# Patient Record
Sex: FEMALE | Race: WHITE | NOT HISPANIC OR LATINO | Employment: OTHER | URBAN - METROPOLITAN AREA
[De-identification: names, ages, dates, MRNs, and addresses within clinical notes are randomized per-mention and may not be internally consistent; named-entity substitution may affect disease eponyms.]

---

## 2023-05-11 DIAGNOSIS — Z78.0 OSTEOPENIA AFTER MENOPAUSE: ICD-10-CM

## 2023-05-11 DIAGNOSIS — M19.90 INFLAMMATORY ARTHRITIS: ICD-10-CM

## 2023-05-11 DIAGNOSIS — Z12.11 ENCOUNTER FOR SCREENING FOR MALIGNANT NEOPLASM OF COLON: ICD-10-CM

## 2023-05-11 DIAGNOSIS — M85.80 OSTEOPENIA AFTER MENOPAUSE: ICD-10-CM

## 2023-05-11 DIAGNOSIS — I10 BENIGN ESSENTIAL HYPERTENSION: Primary | ICD-10-CM

## 2023-05-11 PROBLEM — R91.8 LUNG NODULES: Status: ACTIVE | Noted: 2023-05-11

## 2023-05-11 PROBLEM — M48.02 CERVICAL SPINAL STENOSIS: Status: ACTIVE | Noted: 2023-05-11

## 2023-06-09 ENCOUNTER — LAB (OUTPATIENT)
Dept: LAB | Facility: LAB | Age: 78
End: 2023-06-09
Payer: MEDICARE

## 2023-06-09 DIAGNOSIS — M19.90 INFLAMMATORY ARTHRITIS: ICD-10-CM

## 2023-06-09 DIAGNOSIS — I10 BENIGN ESSENTIAL HYPERTENSION: ICD-10-CM

## 2023-06-09 DIAGNOSIS — M85.80 OSTEOPENIA AFTER MENOPAUSE: ICD-10-CM

## 2023-06-09 DIAGNOSIS — Z78.0 OSTEOPENIA AFTER MENOPAUSE: ICD-10-CM

## 2023-06-09 LAB
ALANINE AMINOTRANSFERASE (SGPT) (U/L) IN SER/PLAS: 26 U/L (ref 7–45)
ALBUMIN (G/DL) IN SER/PLAS: 4.4 G/DL (ref 3.4–5)
ALKALINE PHOSPHATASE (U/L) IN SER/PLAS: 74 U/L (ref 33–136)
ANION GAP IN SER/PLAS: 13 MMOL/L (ref 10–20)
ASPARTATE AMINOTRANSFERASE (SGOT) (U/L) IN SER/PLAS: 29 U/L (ref 9–39)
BASOPHILS (10*3/UL) IN BLOOD BY AUTOMATED COUNT: 0.04 X10E9/L (ref 0–0.1)
BASOPHILS/100 LEUKOCYTES IN BLOOD BY AUTOMATED COUNT: 0.6 % (ref 0–2)
BILIRUBIN TOTAL (MG/DL) IN SER/PLAS: 0.6 MG/DL (ref 0–1.2)
C REACTIVE PROTEIN (MG/L) IN SER/PLAS BY HIGH SENSIT: 1.7 MG/L
CALCIDIOL (25 OH VITAMIN D3) (NG/ML) IN SER/PLAS: 77 NG/ML
CALCIUM (MG/DL) IN SER/PLAS: 9.7 MG/DL (ref 8.6–10.6)
CARBON DIOXIDE, TOTAL (MMOL/L) IN SER/PLAS: 30 MMOL/L (ref 21–32)
CHLORIDE (MMOL/L) IN SER/PLAS: 104 MMOL/L (ref 98–107)
CHOLESTEROL (MG/DL) IN SER/PLAS: 208 MG/DL (ref 0–199)
CHOLESTEROL IN HDL (MG/DL) IN SER/PLAS: 77 MG/DL
CHOLESTEROL/HDL RATIO: 2.7
COBALAMIN (VITAMIN B12) (PG/ML) IN SER/PLAS: >2000 PG/ML (ref 211–911)
CREATININE (MG/DL) IN SER/PLAS: 0.75 MG/DL (ref 0.5–1.05)
EOSINOPHILS (10*3/UL) IN BLOOD BY AUTOMATED COUNT: 0.12 X10E9/L (ref 0–0.4)
EOSINOPHILS/100 LEUKOCYTES IN BLOOD BY AUTOMATED COUNT: 1.8 % (ref 0–6)
ERYTHROCYTE DISTRIBUTION WIDTH (RATIO) BY AUTOMATED COUNT: 13.2 % (ref 11.5–14.5)
ERYTHROCYTE MEAN CORPUSCULAR HEMOGLOBIN CONCENTRATION (G/DL) BY AUTOMATED: 33.2 G/DL (ref 32–36)
ERYTHROCYTE MEAN CORPUSCULAR VOLUME (FL) BY AUTOMATED COUNT: 92 FL (ref 80–100)
ERYTHROCYTES (10*6/UL) IN BLOOD BY AUTOMATED COUNT: 4.72 X10E12/L (ref 4–5.2)
GFR FEMALE: 83 ML/MIN/1.73M2
GLUCOSE (MG/DL) IN SER/PLAS: 90 MG/DL (ref 74–99)
HEMATOCRIT (%) IN BLOOD BY AUTOMATED COUNT: 43.4 % (ref 36–46)
HEMOGLOBIN (G/DL) IN BLOOD: 14.4 G/DL (ref 12–16)
IMMATURE GRANULOCYTES/100 LEUKOCYTES IN BLOOD BY AUTOMATED COUNT: 0.2 % (ref 0–0.9)
LDL: 121 MG/DL (ref 0–99)
LEUKOCYTES (10*3/UL) IN BLOOD BY AUTOMATED COUNT: 6.5 X10E9/L (ref 4.4–11.3)
LYMPHOCYTES (10*3/UL) IN BLOOD BY AUTOMATED COUNT: 2.06 X10E9/L (ref 0.8–3)
LYMPHOCYTES/100 LEUKOCYTES IN BLOOD BY AUTOMATED COUNT: 31.6 % (ref 13–44)
MONOCYTES (10*3/UL) IN BLOOD BY AUTOMATED COUNT: 0.68 X10E9/L (ref 0.05–0.8)
MONOCYTES/100 LEUKOCYTES IN BLOOD BY AUTOMATED COUNT: 10.4 % (ref 2–10)
NEUTROPHILS (10*3/UL) IN BLOOD BY AUTOMATED COUNT: 3.6 X10E9/L (ref 1.6–5.5)
NEUTROPHILS/100 LEUKOCYTES IN BLOOD BY AUTOMATED COUNT: 55.4 % (ref 40–80)
NRBC (PER 100 WBCS) BY AUTOMATED COUNT: 0 /100 WBC (ref 0–0)
PLATELETS (10*3/UL) IN BLOOD AUTOMATED COUNT: 376 X10E9/L (ref 150–450)
POTASSIUM (MMOL/L) IN SER/PLAS: 4.2 MMOL/L (ref 3.5–5.3)
PROTEIN TOTAL: 6.8 G/DL (ref 6.4–8.2)
SODIUM (MMOL/L) IN SER/PLAS: 143 MMOL/L (ref 136–145)
THYROTROPIN (MIU/L) IN SER/PLAS BY DETECTION LIMIT <= 0.05 MIU/L: 1.18 MIU/L (ref 0.44–3.98)
TRIGLYCERIDE (MG/DL) IN SER/PLAS: 51 MG/DL (ref 0–149)
URATE (MG/DL) IN SER/PLAS: 4.6 MG/DL (ref 2.3–6.7)
UREA NITROGEN (MG/DL) IN SER/PLAS: 23 MG/DL (ref 6–23)
VLDL: 10 MG/DL (ref 0–40)

## 2023-06-09 PROCEDURE — 84443 ASSAY THYROID STIM HORMONE: CPT

## 2023-06-09 PROCEDURE — 82607 VITAMIN B-12: CPT

## 2023-06-09 PROCEDURE — 80053 COMPREHEN METABOLIC PANEL: CPT

## 2023-06-09 PROCEDURE — 82306 VITAMIN D 25 HYDROXY: CPT

## 2023-06-09 PROCEDURE — 80061 LIPID PANEL: CPT

## 2023-06-09 PROCEDURE — 36415 COLL VENOUS BLD VENIPUNCTURE: CPT

## 2023-06-09 PROCEDURE — 84550 ASSAY OF BLOOD/URIC ACID: CPT

## 2023-06-09 PROCEDURE — 85025 COMPLETE CBC W/AUTO DIFF WBC: CPT

## 2023-06-09 PROCEDURE — 86141 C-REACTIVE PROTEIN HS: CPT

## 2023-06-12 RX ORDER — FOLIC ACID 0.4 MG
0.4 TABLET ORAL DAILY
COMMUNITY
Start: 2016-06-21

## 2023-06-12 RX ORDER — VIT C/E/ZN/COPPR/LUTEIN/ZEAXAN 250MG-90MG
5000 CAPSULE ORAL
COMMUNITY
Start: 2015-01-23

## 2023-06-12 RX ORDER — VITAMIN E 268 MG
400 CAPSULE ORAL 2 TIMES DAILY
COMMUNITY
Start: 2015-01-23

## 2023-06-12 RX ORDER — ASPIRIN 81 MG/1
81 TABLET ORAL
COMMUNITY
Start: 2015-01-23

## 2023-06-12 RX ORDER — MELOXICAM 7.5 MG/1
1 TABLET ORAL 2 TIMES DAILY PRN
COMMUNITY
Start: 2021-07-19 | End: 2023-10-25 | Stop reason: ALTCHOICE

## 2023-06-12 RX ORDER — IBUPROFEN 100 MG/5ML
1000 SUSPENSION, ORAL (FINAL DOSE FORM) ORAL 2 TIMES DAILY
COMMUNITY
Start: 2015-01-23

## 2023-06-12 RX ORDER — FLAXSEED OIL 1000 MG
2 CAPSULE ORAL
COMMUNITY
Start: 2015-01-23

## 2023-06-12 RX ORDER — TRIAMTERENE AND HYDROCHLOROTHIAZIDE 75; 50 MG/1; MG/1
1 TABLET ORAL DAILY
COMMUNITY
Start: 2014-03-24 | End: 2024-02-16 | Stop reason: SDUPTHER

## 2023-06-12 RX ORDER — CALCIUM CARBONATE 200(500)MG
2 TABLET,CHEWABLE ORAL 2 TIMES DAILY
COMMUNITY
Start: 2016-06-21

## 2023-10-25 ENCOUNTER — OFFICE VISIT (OUTPATIENT)
Dept: PRIMARY CARE | Facility: CLINIC | Age: 78
End: 2023-10-25
Payer: MEDICARE

## 2023-10-25 ENCOUNTER — LAB (OUTPATIENT)
Dept: LAB | Facility: LAB | Age: 78
End: 2023-10-25
Payer: MEDICARE

## 2023-10-25 ENCOUNTER — ANCILLARY PROCEDURE (OUTPATIENT)
Dept: RADIOLOGY | Facility: CLINIC | Age: 78
End: 2023-10-25
Payer: MEDICARE

## 2023-10-25 VITALS
HEART RATE: 72 BPM | BODY MASS INDEX: 24.68 KG/M2 | DIASTOLIC BLOOD PRESSURE: 84 MMHG | WEIGHT: 130.6 LBS | SYSTOLIC BLOOD PRESSURE: 112 MMHG

## 2023-10-25 DIAGNOSIS — Z00.00 ROUTINE GENERAL MEDICAL EXAMINATION AT A HEALTH CARE FACILITY: ICD-10-CM

## 2023-10-25 DIAGNOSIS — R79.9 ABNORMAL FINDING OF BLOOD CHEMISTRY, UNSPECIFIED: ICD-10-CM

## 2023-10-25 DIAGNOSIS — M19.90 INFLAMMATORY ARTHRITIS: ICD-10-CM

## 2023-10-25 DIAGNOSIS — M19.90 INFLAMMATORY ARTHRITIS: Primary | ICD-10-CM

## 2023-10-25 LAB
ALBUMIN SERPL BCP-MCNC: 4.6 G/DL (ref 3.4–5)
ALP SERPL-CCNC: 74 U/L (ref 33–136)
ALT SERPL W P-5'-P-CCNC: 22 U/L (ref 7–45)
ANION GAP SERPL CALC-SCNC: 21 MMOL/L (ref 10–20)
AST SERPL W P-5'-P-CCNC: 28 U/L (ref 9–39)
BILIRUB SERPL-MCNC: 0.6 MG/DL (ref 0–1.2)
BUN SERPL-MCNC: 27 MG/DL (ref 6–23)
CALCIUM SERPL-MCNC: 9.8 MG/DL (ref 8.6–10.6)
CHLORIDE SERPL-SCNC: 101 MMOL/L (ref 98–107)
CHOLEST SERPL-MCNC: 199 MG/DL (ref 0–199)
CHOLESTEROL/HDL RATIO: 2.8
CO2 SERPL-SCNC: 23 MMOL/L (ref 21–32)
CREAT SERPL-MCNC: 0.85 MG/DL (ref 0.5–1.05)
GFR SERPL CREATININE-BSD FRML MDRD: 71 ML/MIN/1.73M*2
GLUCOSE SERPL-MCNC: 89 MG/DL (ref 74–99)
HDLC SERPL-MCNC: 71 MG/DL
LDLC SERPL CALC-MCNC: 116 MG/DL
NON HDL CHOLESTEROL: 128 MG/DL (ref 0–149)
POC APPEARANCE, URINE: CLEAR
POC BILIRUBIN, URINE: NEGATIVE
POC BLOOD, URINE: NEGATIVE
POC COLOR, URINE: YELLOW
POC GLUCOSE, URINE: NEGATIVE MG/DL
POC KETONES, URINE: NEGATIVE MG/DL
POC LEUKOCYTES, URINE: NEGATIVE
POC NITRITE,URINE: NEGATIVE
POC PH, URINE: 6.5 PH
POC PROTEIN, URINE: NEGATIVE MG/DL
POC SPECIFIC GRAVITY, URINE: 1.01
POC UROBILINOGEN, URINE: 0.2 EU/DL
POTASSIUM SERPL-SCNC: 4 MMOL/L (ref 3.5–5.3)
PROT SERPL-MCNC: 7.1 G/DL (ref 6.4–8.2)
SODIUM SERPL-SCNC: 141 MMOL/L (ref 136–145)
TRIGL SERPL-MCNC: 60 MG/DL (ref 0–149)
VLDL: 12 MG/DL (ref 0–40)

## 2023-10-25 PROCEDURE — 1170F FXNL STATUS ASSESSED: CPT | Performed by: INTERNAL MEDICINE

## 2023-10-25 PROCEDURE — 36415 COLL VENOUS BLD VENIPUNCTURE: CPT

## 2023-10-25 PROCEDURE — G0439 PPPS, SUBSEQ VISIT: HCPCS | Performed by: INTERNAL MEDICINE

## 2023-10-25 PROCEDURE — 72110 X-RAY EXAM L-2 SPINE 4/>VWS: CPT | Mod: FY

## 2023-10-25 PROCEDURE — 72110 X-RAY EXAM L-2 SPINE 4/>VWS: CPT | Performed by: RADIOLOGY

## 2023-10-25 PROCEDURE — 80053 COMPREHEN METABOLIC PANEL: CPT

## 2023-10-25 PROCEDURE — 80061 LIPID PANEL: CPT

## 2023-10-25 PROCEDURE — 93000 ELECTROCARDIOGRAM COMPLETE: CPT | Performed by: INTERNAL MEDICINE

## 2023-10-25 PROCEDURE — 3074F SYST BP LT 130 MM HG: CPT | Performed by: INTERNAL MEDICINE

## 2023-10-25 PROCEDURE — 3079F DIAST BP 80-89 MM HG: CPT | Performed by: INTERNAL MEDICINE

## 2023-10-25 PROCEDURE — 81002 URINALYSIS NONAUTO W/O SCOPE: CPT | Performed by: INTERNAL MEDICINE

## 2023-10-25 ASSESSMENT — ACTIVITIES OF DAILY LIVING (ADL)
GROCERY_SHOPPING: INDEPENDENT
DRESSING: INDEPENDENT
MANAGING_FINANCES: INDEPENDENT
DOING_HOUSEWORK: INDEPENDENT
TAKING_MEDICATION: INDEPENDENT
BATHING: INDEPENDENT

## 2023-10-25 ASSESSMENT — ENCOUNTER SYMPTOMS
OCCASIONAL FEELINGS OF UNSTEADINESS: 0
DEPRESSION: 0
LOSS OF SENSATION IN FEET: 0

## 2023-10-31 ENCOUNTER — TELEPHONE (OUTPATIENT)
Dept: PRIMARY CARE | Facility: CLINIC | Age: 78
End: 2023-10-31

## 2023-10-31 NOTE — TELEPHONE ENCOUNTER
Left message. Would like a call back to discuss PT. Patient says she missed your call this weekend.

## 2023-11-01 DIAGNOSIS — M19.90 INFLAMMATORY ARTHRITIS: Primary | ICD-10-CM

## 2023-11-01 RX ORDER — METHYLPREDNISOLONE 4 MG/1
TABLET ORAL
Qty: 21 TABLET | Refills: 0 | Status: SHIPPED | OUTPATIENT
Start: 2023-11-01 | End: 2023-11-08

## 2023-11-01 NOTE — PROGRESS NOTES
X-rays reviewed.  I will add a Medrol Dosepak and the patient will call me in 5 days with her condition

## 2023-11-12 ENCOUNTER — TELEPHONE (OUTPATIENT)
Dept: PRIMARY CARE | Facility: CLINIC | Age: 78
End: 2023-11-12
Payer: MEDICARE

## 2023-11-12 DIAGNOSIS — M79.18 RIGHT BUTTOCK PAIN: Primary | ICD-10-CM

## 2023-11-14 NOTE — TELEPHONE ENCOUNTER
Patient reports no change in buttock pain, I have ordered to MRI of the lumbosacral spine and coccyx

## 2023-11-29 ENCOUNTER — APPOINTMENT (OUTPATIENT)
Dept: RADIOLOGY | Facility: HOSPITAL | Age: 78
End: 2023-11-29
Payer: MEDICARE

## 2023-11-29 ENCOUNTER — TELEPHONE (OUTPATIENT)
Dept: PRIMARY CARE | Facility: CLINIC | Age: 78
End: 2023-11-29
Payer: MEDICARE

## 2023-11-29 DIAGNOSIS — M19.90 INFLAMMATORY ARTHRITIS: ICD-10-CM

## 2023-11-29 DIAGNOSIS — M79.18 RIGHT BUTTOCK PAIN: Primary | ICD-10-CM

## 2023-11-29 RX ORDER — METHYLPREDNISOLONE 4 MG/1
TABLET ORAL
Qty: 21 TABLET | Refills: 0 | Status: SHIPPED | OUTPATIENT
Start: 2023-11-29 | End: 2023-12-06

## 2023-11-29 NOTE — TELEPHONE ENCOUNTER
Patient called again today about imaging issue not being covered by insurance. She was yelling on the voicemail and demanding that she get a call back from you tonight after 6pm because she hasn't heard anything from you.

## 2023-12-11 DIAGNOSIS — M48.061 SPINAL STENOSIS OF LUMBAR REGION WITHOUT NEUROGENIC CLAUDICATION: Primary | ICD-10-CM

## 2023-12-11 RX ORDER — DULOXETIN HYDROCHLORIDE 20 MG/1
20 CAPSULE, DELAYED RELEASE ORAL DAILY
COMMUNITY
End: 2023-12-11 | Stop reason: SDUPTHER

## 2023-12-11 RX ORDER — DULOXETIN HYDROCHLORIDE 20 MG/1
20 CAPSULE, DELAYED RELEASE ORAL DAILY
Qty: 30 CAPSULE | Refills: 1 | Status: SHIPPED | OUTPATIENT
Start: 2023-12-11 | End: 2024-02-05 | Stop reason: SDUPTHER

## 2023-12-11 NOTE — PROGRESS NOTES
Patient with continued buttock and left thigh pain.  MRI obtained on December 8 revealed central canal stenosis, bilateral neuroforaminal stenosis at L1-L2 L2-L3 L3-L4 and especially at L4-L5; there is bilateral neuroforaminal stenosis at L5-S1.  I will refer the patient to pain management for further evaluation and treatment I also will start the patient on duloxetine 20 mg daily.  She will alternate with use of Aleve and Tylenol.

## 2023-12-13 DIAGNOSIS — M48.062 SPINAL STENOSIS OF LUMBAR REGION WITH NEUROGENIC CLAUDICATION: Primary | ICD-10-CM

## 2024-01-11 ENCOUNTER — APPOINTMENT (OUTPATIENT)
Dept: PHYSICAL THERAPY | Facility: CLINIC | Age: 79
End: 2024-01-11
Payer: MEDICARE

## 2024-02-05 ENCOUNTER — OFFICE VISIT (OUTPATIENT)
Dept: PRIMARY CARE | Facility: CLINIC | Age: 79
End: 2024-02-05
Payer: MEDICARE

## 2024-02-05 VITALS
HEART RATE: 70 BPM | SYSTOLIC BLOOD PRESSURE: 120 MMHG | DIASTOLIC BLOOD PRESSURE: 84 MMHG | BODY MASS INDEX: 24.49 KG/M2 | WEIGHT: 129.6 LBS

## 2024-02-05 DIAGNOSIS — Z00.00 ROUTINE GENERAL MEDICAL EXAMINATION AT A HEALTH CARE FACILITY: ICD-10-CM

## 2024-02-05 DIAGNOSIS — R82.998 LEUKOCYTES IN URINE: ICD-10-CM

## 2024-02-05 DIAGNOSIS — R31.0 GROSS HEMATURIA: Primary | ICD-10-CM

## 2024-02-05 DIAGNOSIS — M48.061 SPINAL STENOSIS OF LUMBAR REGION WITHOUT NEUROGENIC CLAUDICATION: ICD-10-CM

## 2024-02-05 LAB
POC APPEARANCE, URINE: CLEAR
POC BILIRUBIN, URINE: NEGATIVE
POC BLOOD, URINE: ABNORMAL
POC COLOR, URINE: YELLOW
POC GLUCOSE, URINE: NEGATIVE MG/DL
POC KETONES, URINE: NEGATIVE MG/DL
POC LEUKOCYTES, URINE: ABNORMAL
POC NITRITE,URINE: NEGATIVE
POC PH, URINE: 6 PH
POC PROTEIN, URINE: NEGATIVE MG/DL
POC SPECIFIC GRAVITY, URINE: 1.01
POC UROBILINOGEN, URINE: 0.2 EU/DL

## 2024-02-05 PROCEDURE — 81002 URINALYSIS NONAUTO W/O SCOPE: CPT | Performed by: INTERNAL MEDICINE

## 2024-02-05 PROCEDURE — 1159F MED LIST DOCD IN RCRD: CPT | Performed by: INTERNAL MEDICINE

## 2024-02-05 PROCEDURE — 3074F SYST BP LT 130 MM HG: CPT | Performed by: INTERNAL MEDICINE

## 2024-02-05 PROCEDURE — 87186 SC STD MICRODIL/AGAR DIL: CPT

## 2024-02-05 PROCEDURE — 3079F DIAST BP 80-89 MM HG: CPT | Performed by: INTERNAL MEDICINE

## 2024-02-05 PROCEDURE — 99213 OFFICE O/P EST LOW 20 MIN: CPT | Performed by: INTERNAL MEDICINE

## 2024-02-05 PROCEDURE — 87086 URINE CULTURE/COLONY COUNT: CPT

## 2024-02-05 PROCEDURE — 1160F RVW MEDS BY RX/DR IN RCRD: CPT | Performed by: INTERNAL MEDICINE

## 2024-02-05 RX ORDER — NITROFURANTOIN 25; 75 MG/1; MG/1
100 CAPSULE ORAL 2 TIMES DAILY
Qty: 10 CAPSULE | Refills: 0 | Status: SHIPPED | OUTPATIENT
Start: 2024-02-05 | End: 2024-02-10

## 2024-02-05 RX ORDER — DULOXETIN HYDROCHLORIDE 20 MG/1
20 CAPSULE, DELAYED RELEASE ORAL DAILY
Qty: 30 CAPSULE | Refills: 3 | Status: SHIPPED | OUTPATIENT
Start: 2024-02-05

## 2024-02-05 NOTE — PROGRESS NOTES
Subjective   Patient ID: Viki Alejandre is a 78 y.o. female who presents for follow-up visit.    HPI   The patient reports experiencing gross hematuria the mornings of February 2, February 3, February 4.  She reports associated burning stinging sensation with urination at those times.  She does report a history of a sensation of incomplete emptying of the bladder x 3 months.  She reports no fever, chills, abdominal pain, nausea, vomiting, urgency, frequency, flank pain.  Review of Systems    Objective   There were no vitals taken for this visit.    Physical Exam  Abdomen-soft, nondistended.  Normal active bowel sounds.  Palpation revealed no tenderness or masses  Musculoskeletal  Palpation/percussion of CVAs revealed no tenderness  Assessment/Plan        Assessment  History of 3 episodes of dysuria, gross hematuria-be secondary to uncomplicated urinary tract infection, urolithiasis, nephrolithiasis  Osteoarthritis and degenerative disc disease of the lumbosacral spine  Plan  Obtain urinalysis today.

## 2024-02-06 ENCOUNTER — EVALUATION (OUTPATIENT)
Dept: PHYSICAL THERAPY | Facility: CLINIC | Age: 79
End: 2024-02-06
Payer: MEDICARE

## 2024-02-06 DIAGNOSIS — M48.062 SPINAL STENOSIS OF LUMBAR REGION WITH NEUROGENIC CLAUDICATION: ICD-10-CM

## 2024-02-06 DIAGNOSIS — G89.29 CHRONIC BILATERAL LOW BACK PAIN, UNSPECIFIED WHETHER SCIATICA PRESENT: Primary | ICD-10-CM

## 2024-02-06 DIAGNOSIS — M54.50 CHRONIC BILATERAL LOW BACK PAIN, UNSPECIFIED WHETHER SCIATICA PRESENT: Primary | ICD-10-CM

## 2024-02-06 PROCEDURE — 97110 THERAPEUTIC EXERCISES: CPT | Mod: GP

## 2024-02-06 PROCEDURE — 97162 PT EVAL MOD COMPLEX 30 MIN: CPT | Mod: GP

## 2024-02-06 ASSESSMENT — PAIN SCALES - GENERAL: PAINLEVEL_OUTOF10: 2

## 2024-02-06 ASSESSMENT — ENCOUNTER SYMPTOMS
OCCASIONAL FEELINGS OF UNSTEADINESS: 0
LOSS OF SENSATION IN FEET: 0
DEPRESSION: 0

## 2024-02-06 ASSESSMENT — PAIN DESCRIPTION - DESCRIPTORS: DESCRIPTORS: DULL

## 2024-02-06 ASSESSMENT — PAIN - FUNCTIONAL ASSESSMENT: PAIN_FUNCTIONAL_ASSESSMENT: 0-10

## 2024-02-06 NOTE — PROGRESS NOTES
Physical Therapy    Physical Therapy Evaluation    Patient Name: Viki Alejandre  MRN: [id number]  Today's Date: 2/6/2024  Time Calculation  Start Time: 1000  Stop Time: 1110  Time Calculation (min): 70 min  Visit #1    Assessment; Patient presents with 6-7 months long history of pain in left buttock and partially right buttock, with potential for pain to travel along left leg into left Calf. Pain started while doing side step lunges with cattle bell in gym.  Palpation of left Gluteals and left Greater Trochanter tender and painful. Left hip external rotation + for left hip/buttock pain production. Lumbar movements in sagittal plane do not produce any symptoms. Palpation of lumbar spine free of pain. Provisional classification is left Gluteal strain. Posture fair. Tightness of Hamstrings and ITB present. Patient will benefit from learning and starting stretching and repeated movement exercises regiment. PT recommends 5 follows ups, once a week     Plan  Treatment/Interventions: Education/ Instruction, Therapeutic exercises  PT Plan: Skilled PT  Certification Period Start Date: 02/06/24  Certification Period End Date: 05/03/24  Rehab Potential: Good  Plan of Care Agreement: Patient  Recommended frequency; 1w5. Manual therapy to be used as deemed appropriate  by PT    Current Problem  1. Chronic bilateral low back pain, unspecified whether sciatica present  Follow Up In Physical Therapy      2. Spinal stenosis of lumbar region with neurogenic claudication  Referral to Physical Therapy    Follow Up In Physical Therapy          Subjective   General:  General  Reason for Referral: PT eval and treat for spinal stenosis in lumbar spine  Referred By: Brian Centeno Dr  Past Medical History Relevant to Rehab: Onset of pain in both buttocks in June of 2023. Patient started with me in gym, doing lunges to the right with cattle belts. That was beginning of my pain  General Comment: I have been struggling wiht this pain now for 7  months (I saw Dr Hines at end of September twice. I was pain free 3-4 days and after that pain came back and it was worse than prior to my first visit. Second time he was not able to help)  Precautions:  Precautions  FRANKADI Fall Risk Score (The score of 4 or more indicates an increased risk of falling): 2  Vital Signs:     Pain:  Pain Assessment: 0-10  Pain Score: 2  Pain Type: Chronic pain  Pain Location:  (4/10 - left buttock, and 2/10 sitting bone)  Pain Radiating Towards: At night pain radiates down legs, sometimes almost down to my Calf muscles (I sleep in fetal position, but to reveal pain I have to flip over to my back)  Pain Descriptors: Dull (and deep pain)  Pain Frequency: Intermittent  Pain Onset:  (pain come on pretty quick)  Clinical Progression: Gradually worsening  Effect of Pain on Daily Activities: I am not going to gym or Yoga since September of 2023  Home Living:     Prior Function Per Pt/Caregiver Report:     Objective   Posture: WNL     Range of Motion: Full lumbar ROM present in all planes. Lumbar flexion from standing produces mild left hip ache  Left hip external rotation moderately limited and painful. Right hip external rotation WNL  Bilateral hips flexion, extension, abduction, internal rotations WNL     Strength: Core 5/5                   Left hip flexion 5-/5                   Left hip abduction 5-/5                   Left hip extension 5/5  Right hip musculature 5/5     Flexibility: Left Gluteals, Piriformis, and Hamstrings tight     Palpation: tenderness and pain present with palpation of left Gluteals and Greater Trochanter     Special Tests: + left Chayo test      Outcome Measures:  Modifies Oswestry = 22 %     OP EDUCATION:  Outpatient Education  Individual(s) Educated: Patient  Education Provided: Home Exercise Program, POC  Diagnosis and Precautions: lower back and buttocks and legs pain associated with stenosis  Risk and Benefits Discussed with Patient/Caregiver/Other:  yes  Patient/Caregiver Demonstrated Understanding: yes  Plan of Care Discussed and Agreed Upon: yes  Patient Response to Education: Patient/Caregiver Verbalized Understanding of Information    Intervention;   Hook lying lower trunk rotation  Single knee to chest repeated full hip flexion x 10  Repeated lumbar flexion from supine and sitting (RFIL and RFIS)  Seated left Hamstrings stretches and right Hamstrings stretches  Standing repeated left ITB stretches   All recommended morning, mid day, and evening    Goals:  Active       PT Problem       PT Goal 1       Start:  02/06/24    Expected End:  04/05/24       Patient will reduce difficulties related to functional mobilities and quality of life, as evident by Modified Oswestry score will reduce by 50%         PT Goal 2       Start:  02/06/24    Expected End:  04/05/24       Patient will report reduced/abolished pain in left/right hip, indicated by grade of 0-2 on 0-10 pain scale, not exceeding intensity through different activities          PT Goal 3       Start:  02/06/24    Expected End:  04/05/24       Patient will demonstrated improved ROM of left hip external rotation, indicated by bilaterally equal external rotation, without symptoms           PT Goal 4       Start:  02/06/24    Expected End:  04/05/24       Patient will demonstrated improved strength of left hip musculature, evident by MMT of 5/5, and functionally evident by modified squats, step ups and ability to resume some of gym workouts as well as yoga          PT Goal 5       Start:  02/06/24    Expected End:  04/05/24       Patient will demonstrated knowledge of HEP, its maintenance frequency, and associated benefits

## 2024-02-08 LAB — BACTERIA UR CULT: ABNORMAL

## 2024-02-12 DIAGNOSIS — N30.01 ACUTE CYSTITIS WITH HEMATURIA: Primary | ICD-10-CM

## 2024-02-13 ENCOUNTER — TREATMENT (OUTPATIENT)
Dept: PHYSICAL THERAPY | Facility: CLINIC | Age: 79
End: 2024-02-13
Payer: MEDICARE

## 2024-02-13 ENCOUNTER — APPOINTMENT (OUTPATIENT)
Dept: PHYSICAL THERAPY | Facility: CLINIC | Age: 79
End: 2024-02-13
Payer: MEDICARE

## 2024-02-13 DIAGNOSIS — G89.29 CHRONIC BILATERAL LOW BACK PAIN, UNSPECIFIED WHETHER SCIATICA PRESENT: Primary | ICD-10-CM

## 2024-02-13 DIAGNOSIS — M48.062 SPINAL STENOSIS OF LUMBAR REGION WITH NEUROGENIC CLAUDICATION: ICD-10-CM

## 2024-02-13 DIAGNOSIS — M54.50 CHRONIC BILATERAL LOW BACK PAIN, UNSPECIFIED WHETHER SCIATICA PRESENT: Primary | ICD-10-CM

## 2024-02-13 PROCEDURE — 97110 THERAPEUTIC EXERCISES: CPT | Mod: GP

## 2024-02-13 RX ORDER — CEPHALEXIN 500 MG/1
500 CAPSULE ORAL 3 TIMES DAILY
Qty: 21 CAPSULE | Refills: 0 | Status: SHIPPED | OUTPATIENT
Start: 2024-02-13 | End: 2024-02-20

## 2024-02-13 NOTE — PROGRESS NOTES
The patient reports that she is still symptomatic after completing her 5-day course of Macrobid, I have switched her over to cephalexin 500 mg p.o. 3 times daily x 7 days.  She will call me in 7 days with her condition

## 2024-02-13 NOTE — PROGRESS NOTES
Physical Therapy    Physical Therapy follow up    Patient Name: Viki Alejandre  MRN: [id number]  Today's Date: 2/13/2024     Visit #2    Assessment; Patient presents with 6-7 months long history of pain in left buttock and partially right buttock, with potential for pain to travel along left leg into left Calf. Pain started while doing side step lunges with cattle bell in gym.  Palpation of left Gluteals and left Greater Trochanter tender and painful. Left hip external rotation + for left hip/buttock pain production. Lumbar movements in sagittal plane do not produce any symptoms. Palpation of lumbar spine free of pain. Provisional classification is left Gluteal strain. Posture fair. Tightness of Hamstrings and ITB present. Patient will benefit from learning and starting stretching and repeated movement exercises regiment. PT recommends 5 follows ups, once a week  2-; No exacerbation of pain with revised therex. Exercises understood and demonstrated correctly. Pictures for HEP also understood. All initially provided exercises stopped.      Plan     Patient education/postural corrections and body mechanics  Therapeutic exercises  Recommended frequency; 1w5. Manual therapy to be used as deemed appropriate  by PT    Current Problem  1. Chronic bilateral low back pain, unspecified whether sciatica present        2. Spinal stenosis of lumbar region with neurogenic claudication            Subjective   General: Pain in my hips has improved since last week, but I had to stop my previous stretches completely, because I could not get up out of bed a few days after I started them.      Precautions:     Vital Signs: NA     Pain: 2/10 right butt cheek, where top of leg and buttock come together. Side of left hip also, hurts, but this morning its okay, only about 2/10     Home Living:     Prior Function Per Pt/Caregiver Report:     Objective   Posture: WNL     Range of Motion: Full lumbar ROM present in all planes. Lumbar  flexion from standing produces mild left hip ache  Left hip external rotation moderately limited and painful. Right hip external rotation WNL  Bilateral hips flexion, extension, abduction, internal rotations WNL     Strength: Core 5/5                   Left hip flexion 5-/5                   Left hip abduction 5-/5                   Left hip extension 5/5  Right hip musculature 5/5     Flexibility: Left Gluteals, Piriformis, and Hamstrings tight     Palpation: tenderness and pain present with palpation of left Gluteals and Greater Trochanter     Special Tests: + left Chayo test      Outcome Measures:  Modifies Oswestry = 22 %     OP EDUCATION:       Intervention;   Seated right and left Piriformis stretches; 30 seconds x 3  Hook lying isometric Transverse Abdominals contractions  Hook lying isometric inner thighs contractions  Hook lying partial contractions of hip abductors with resistance of green t-band  TrA iso activation with slow straight leg lifts   All instructed in slow reps of 10, with rest breaks and repeats in AM and PM on daily bases   Handout provided with revised HEP  Goals:  Active       PT Problem       PT Goal 1       Start:  02/06/24    Expected End:  04/05/24       Patient will reduce difficulties related to functional mobilities and quality of life, as evident by Modified Oswestry score will reduce by 50%         PT Goal 2       Start:  02/06/24    Expected End:  04/05/24       Patient will report reduced/abolished pain in left/right hip, indicated by grade of 0-2 on 0-10 pain scale, not exceeding intensity through different activities          PT Goal 3       Start:  02/06/24    Expected End:  04/05/24       Patient will demonstrated improved ROM of left hip external rotation, indicated by bilaterally equal external rotation, without symptoms           PT Goal 4       Start:  02/06/24    Expected End:  04/05/24       Patient will demonstrated improved strength of left hip musculature, evident  by MMT of 5/5, and functionally evident by modified squats, step ups and ability to resume some of gym workouts as well as yoga          PT Goal 5       Start:  02/06/24    Expected End:  04/05/24       Patient will demonstrated knowledge of HEP, its maintenance frequency, and associated benefits

## 2024-02-16 DIAGNOSIS — I10 BENIGN ESSENTIAL HYPERTENSION: Primary | ICD-10-CM

## 2024-02-16 RX ORDER — TRIAMTERENE AND HYDROCHLOROTHIAZIDE 75; 50 MG/1; MG/1
1 TABLET ORAL DAILY
Qty: 90 TABLET | Refills: 2 | Status: SHIPPED | OUTPATIENT
Start: 2024-02-16

## 2024-02-20 ENCOUNTER — TREATMENT (OUTPATIENT)
Dept: PHYSICAL THERAPY | Facility: CLINIC | Age: 79
End: 2024-02-20
Payer: MEDICARE

## 2024-02-20 DIAGNOSIS — G89.29 CHRONIC BILATERAL LOW BACK PAIN, UNSPECIFIED WHETHER SCIATICA PRESENT: Primary | ICD-10-CM

## 2024-02-20 DIAGNOSIS — M54.50 CHRONIC BILATERAL LOW BACK PAIN, UNSPECIFIED WHETHER SCIATICA PRESENT: Primary | ICD-10-CM

## 2024-02-20 DIAGNOSIS — M48.062 SPINAL STENOSIS OF LUMBAR REGION WITH NEUROGENIC CLAUDICATION: ICD-10-CM

## 2024-02-20 DIAGNOSIS — R82.998 LEUKOCYTES IN URINE: Primary | ICD-10-CM

## 2024-02-20 PROCEDURE — 97110 THERAPEUTIC EXERCISES: CPT | Mod: GP

## 2024-02-20 NOTE — PROGRESS NOTES
Physical Therapy    Physical Therapy follow up    Patient Name: Viki Alejandre  MRN: [id number]  Today's Date: 2/20/2024     Visit #3    Assessment; Patient presents with 6-7 months long history of pain in left buttock and partially right buttock, with potential for pain to travel along left leg into left Calf. Pain started while doing side step lunges with cattle bell in gym.  Palpation of left Gluteals and left Greater Trochanter tender and painful. Left hip external rotation + for left hip/buttock pain production. Lumbar movements in sagittal plane do not produce any symptoms. Palpation of lumbar spine free of pain. Provisional classification is left Gluteal strain. Posture fair. Tightness of Hamstrings and ITB present. Patient will benefit from learning and starting stretching and repeated movement exercises regiment. PT recommends 5 follows ups, once a week  2-; No exacerbation of pain with revised therex. Exercises understood and demonstrated correctly. Pictures for HEP also understood. All initially provided exercises stopped.   2-; Revised exercises did not produce and exacerbated pain in hips. New pictures understood and patient demonstrated correct form. Initial pain minimized by end of session.      Plan     Patient education/postural corrections and body mechanics  Therapeutic exercises  Recommended frequency; 1w5. Manual therapy to be used as deemed appropriate  by PT    Current Problem  1. Chronic bilateral low back pain, unspecified whether sciatica present        2. Spinal stenosis of lumbar region with neurogenic claudication            Subjective   General: I believe that lying down exercise with bands caused me more pain. I am discouraged that my bursa pain does not seem to go away and I am still staying away from gym and yoga classes.   Precautions:     Vital Signs: NA     Pain: 5/10 to 6/10 left buttock and about 4-5/10 on right hip. I cannot climb stairs leading with left foot at  all.    Home Living:     Prior Function Per Pt/Caregiver Report:     Objective   Posture: WNL     Range of Motion: Full lumbar ROM present in all planes. Lumbar flexion from standing produces mild left hip ache  Left hip external rotation moderately limited and painful. Right hip external rotation WNL  Bilateral hips flexion, extension, abduction, internal rotations WNL     Strength: Core 5/5                   Left hip flexion 5-/5                   Left hip abduction 5-/5                   Left hip extension 5/5  Right hip musculature 5/5     Flexibility: Left Gluteals, Piriformis, and Hamstrings tight     Palpation: tenderness and pain present with palpation of left Gluteals and Greater Trochanter     Special Tests: + left Chayo test      Outcome Measures:  Modifies Oswestry = 22 %     OP EDUCATION:       Intervention;   There ex time x 45 minutes  Instructed prone position  Instructed prone Hamstrings curls in sets of 10 reps on each side  Instructed modified and only partial lumbar extensions off elbows in prone (REIL)  Added prone hip extensions with low leg lifts, while avoiding lumbar extensions  Added green t-bands for rows  Added side step Abs with green t-bands  All exercise verbally and tactile guided in order to assure correct form and avoid pain exacerbation. New pictures provided for revised HEP.   Goals:  Active       PT Problem       PT Goal 1       Start:  02/06/24    Expected End:  04/05/24       Patient will reduce difficulties related to functional mobilities and quality of life, as evident by Modified Oswestry score will reduce by 50%         PT Goal 2       Start:  02/06/24    Expected End:  04/05/24       Patient will report reduced/abolished pain in left/right hip, indicated by grade of 0-2 on 0-10 pain scale, not exceeding intensity through different activities          PT Goal 3       Start:  02/06/24    Expected End:  04/05/24       Patient will demonstrated improved ROM of left hip  external rotation, indicated by bilaterally equal external rotation, without symptoms           PT Goal 4       Start:  02/06/24    Expected End:  04/05/24       Patient will demonstrated improved strength of left hip musculature, evident by MMT of 5/5, and functionally evident by modified squats, step ups and ability to resume some of gym workouts as well as yoga          PT Goal 5       Start:  02/06/24    Expected End:  04/05/24       Patient will demonstrated knowledge of HEP, its maintenance frequency, and associated benefits

## 2024-02-21 ENCOUNTER — LAB (OUTPATIENT)
Dept: LAB | Facility: LAB | Age: 79
End: 2024-02-21
Payer: MEDICARE

## 2024-02-21 DIAGNOSIS — R82.998 LEUKOCYTES IN URINE: ICD-10-CM

## 2024-02-21 LAB
APPEARANCE UR: CLEAR
BILIRUB UR STRIP.AUTO-MCNC: NEGATIVE MG/DL
COLOR UR: NORMAL
GLUCOSE UR STRIP.AUTO-MCNC: NORMAL MG/DL
HOLD SPECIMEN: NORMAL
KETONES UR STRIP.AUTO-MCNC: NEGATIVE MG/DL
LEUKOCYTE ESTERASE UR QL STRIP.AUTO: NEGATIVE
NITRITE UR QL STRIP.AUTO: NEGATIVE
PH UR STRIP.AUTO: 5.5 [PH]
PROT UR STRIP.AUTO-MCNC: NEGATIVE MG/DL
RBC # UR STRIP.AUTO: NEGATIVE /UL
SP GR UR STRIP.AUTO: 1.01
UROBILINOGEN UR STRIP.AUTO-MCNC: NORMAL MG/DL

## 2024-02-21 PROCEDURE — 81003 URINALYSIS AUTO W/O SCOPE: CPT

## 2024-02-27 ENCOUNTER — APPOINTMENT (OUTPATIENT)
Dept: PHYSICAL THERAPY | Facility: CLINIC | Age: 79
End: 2024-02-27
Payer: MEDICARE

## 2024-02-27 ENCOUNTER — DOCUMENTATION (OUTPATIENT)
Dept: PHYSICAL THERAPY | Facility: CLINIC | Age: 79
End: 2024-02-27
Payer: MEDICARE

## 2024-02-27 NOTE — PROGRESS NOTES
Physical Therapy                 Therapy Communication Note    Patient Name: Viki Alejandre  MRN: [id number]  Today's Date: 2/27/2024     Discipline: Physical Therapy    Missed Visit Reason:  Called and canceled with , having problems walking.     Missed Time: Cancel    Comment:  
Initial (On Arrival)

## 2024-03-05 ENCOUNTER — TREATMENT (OUTPATIENT)
Dept: PHYSICAL THERAPY | Facility: CLINIC | Age: 79
End: 2024-03-05
Payer: MEDICARE

## 2024-03-05 DIAGNOSIS — M48.062 SPINAL STENOSIS OF LUMBAR REGION WITH NEUROGENIC CLAUDICATION: ICD-10-CM

## 2024-03-05 DIAGNOSIS — M54.50 CHRONIC BILATERAL LOW BACK PAIN, UNSPECIFIED WHETHER SCIATICA PRESENT: ICD-10-CM

## 2024-03-05 DIAGNOSIS — G89.29 CHRONIC BILATERAL LOW BACK PAIN, UNSPECIFIED WHETHER SCIATICA PRESENT: ICD-10-CM

## 2024-03-05 PROCEDURE — 97110 THERAPEUTIC EXERCISES: CPT | Mod: GP

## 2024-03-05 NOTE — PROGRESS NOTES
Physical Therapy    Physical Therapy follow up    Patient Name: Viki Alejandre  MRN: [id number]  Today's Date: 3/5/2024     PT Therapeutic Procedures Time Entry  Therapeutic Exercise Time Entry: 44                     Visit #4  [facility]    Assessment; Patient presents with 6-7 months long history of pain in left buttock and partially right buttock, with potential for pain to travel along left leg into left Calf. Pain started while doing side step lunges with cattle bell in gym.  Palpation of left Gluteals and left Greater Trochanter tender and painful. Left hip external rotation + for left hip/buttock pain production. Lumbar movements in sagittal plane do not produce any symptoms. Palpation of lumbar spine free of pain. Provisional classification is left Gluteal strain. Posture fair. Tightness of Hamstrings and ITB present. Patient will benefit from learning and starting stretching and repeated movement exercises regiment. PT recommends 5 follows ups, once a week  2-; No exacerbation of pain with revised therex. Exercises understood and demonstrated correctly. Pictures for HEP also understood. All initially provided exercises stopped.   2-; Revised exercises did not produce and exacerbated pain in hips. New pictures understood and patient demonstrated correct form. Initial pain minimized by end of session.   3-5-2024; No pain at rest, no pain while walking level floors or squatting. Gait normal. Ascending/descending stairs with mild and short lasting left hip discomfort. New HEP revision understood and performed without pain exacerbation     Plan     Patient education/postural corrections and body mechanics  Therapeutic exercises  Recommended frequency; 1w5. Manual therapy to be used as deemed appropriate  by PT  Reassess readiness to return to recreational yoga    Current Problem  1. Spinal stenosis of lumbar region with neurogenic claudication  Follow Up In Physical Therapy      2. Chronic  "bilateral low back pain, unspecified whether sciatica present  Follow Up In Physical Therapy          Subjective The only pain I have as of this morning, is if I bend donw to  my poppies toys from the floor or if I try to climb stairs leading with my left foot\"    General: I believe that lying down exercise with bands caused me more pain. I am discouraged that my bursa pain does not seem to go away and I am still staying away from gym and yoga classes.   Precautions:     Vital Signs: NA     Pain: \"   Home Living: with family, with multiple steps      Prior Function Per Pt/Caregiver Report:     Objective   Posture: WNL     Range of Motion: Full lumbar ROM present in all planes. Lumbar flexion from standing produces mild left hip ache  Left hip external rotation moderately limited and painful. Right hip external rotation WNL  Bilateral hips flexion, extension, abduction, internal rotations WNL     Strength: Core 5/5                   Left hip flexion 5-/5                   Left hip abduction 5-/5                   Left hip extension 5/5  Right hip musculature 5/5     Flexibility: Left Gluteals, Piriformis, and Hamstrings tight     Palpation: tenderness and pain present with palpation of left Gluteals and Greater Trochanter     Special Tests: + left Chayo test      Outcome Measures:  Modifies Oswestry = 22 %     OP EDUCATION:       Intervention;   There ex time x 45 minutes  Quadruped left hip extensions with slow and eccentric knee return to rest x 10 reps  Left and right clam shells, with submaximal manual resistnace provided by PT, an eccentric leg lowering x 10 reps each  Bridge with slow eccentric hips return to hook lying x 10 reps  Modified squats in front of standard chair x 10 reps  Instructed and encouraged guarding against overdoing reps, \"less is more\"strategy discussed.   Goals:  Active       PT Problem       PT Goal 1       Start:  02/06/24    Expected End:  04/05/24       Patient will reduce " difficulties related to functional mobilities and quality of life, as evident by Modified Oswestry score will reduce by 50%         PT Goal 2       Start:  02/06/24    Expected End:  04/05/24       Patient will report reduced/abolished pain in left/right hip, indicated by grade of 0-2 on 0-10 pain scale, not exceeding intensity through different activities          PT Goal 3       Start:  02/06/24    Expected End:  04/05/24       Patient will demonstrated improved ROM of left hip external rotation, indicated by bilaterally equal external rotation, without symptoms           PT Goal 4       Start:  02/06/24    Expected End:  04/05/24       Patient will demonstrated improved strength of left hip musculature, evident by MMT of 5/5, and functionally evident by modified squats, step ups and ability to resume some of gym workouts as well as yoga          PT Goal 5       Start:  02/06/24    Expected End:  04/05/24       Patient will demonstrated knowledge of HEP, its maintenance frequency, and associated benefits

## 2024-03-12 ENCOUNTER — TREATMENT (OUTPATIENT)
Dept: PHYSICAL THERAPY | Facility: CLINIC | Age: 79
End: 2024-03-12
Payer: MEDICARE

## 2024-03-12 DIAGNOSIS — G89.29 CHRONIC BILATERAL LOW BACK PAIN, UNSPECIFIED WHETHER SCIATICA PRESENT: Primary | ICD-10-CM

## 2024-03-12 DIAGNOSIS — M48.062 SPINAL STENOSIS OF LUMBAR REGION WITH NEUROGENIC CLAUDICATION: ICD-10-CM

## 2024-03-12 DIAGNOSIS — M54.50 CHRONIC BILATERAL LOW BACK PAIN, UNSPECIFIED WHETHER SCIATICA PRESENT: Primary | ICD-10-CM

## 2024-03-12 PROCEDURE — 97110 THERAPEUTIC EXERCISES: CPT | Mod: GP

## 2024-03-12 NOTE — PROGRESS NOTES
Physical Therapy    Physical Therapy follow up    Patient Name: Viki Alejandre  MRN: [id number]  Today's Date: 3/12/2024     PT Therapeutic Procedures Time Entry  Therapeutic Exercise Time Entry: 45                                        Visit #5  Insurance; [facility]    Assessment; Patient presents with 6-7 months long history of pain in left buttock and partially right buttock, with potential for pain to travel along left leg into left Calf. Pain started while doing side step lunges with cattle bell in gym.  Palpation of left Gluteals and left Greater Trochanter tender and painful. Left hip external rotation + for left hip/buttock pain production. Lumbar movements in sagittal plane do not produce any symptoms. Palpation of lumbar spine free of pain. Provisional classification is left Gluteal strain. Posture fair. Tightness of Hamstrings and ITB present. Patient will benefit from learning and starting stretching and repeated movement exercises regiment. PT recommends 5 follows ups, once a week  2-; No exacerbation of pain with revised therex. Exercises understood and demonstrated correctly. Pictures for HEP also understood. All initially provided exercises stopped.   2-; Revised exercises did not produce and exacerbated pain in hips. New pictures understood and patient demonstrated correct form. Initial pain minimized by end of session.   3-5-2024; No pain at rest, no pain while walking level floors or squatting. Gait normal. Ascending/descending stairs with mild and short lasting left hip discomfort. New HEP revision understood and performed without pain exacerbation  3-; Revision of exercises and reduction of frequency and reps seem to be helping and controlling symptoms relatively low. Patient provided and instruction for HEP and showed good understanding, without exacerbation of pain.      Plan     Patient education/postural corrections and body mechanics  Therapeutic exercises  Recommended  frequency; 1w5. Manual therapy to be used as deemed appropriate  by PT  Reassess readiness to return to recreational yoga    Current Problem  1. Chronic bilateral low back pain, unspecified whether sciatica present        2. Spinal stenosis of lumbar region with neurogenic claudication              General: I feel better for a few days now since last session, and for the first time in while. I am experiencing some sciatic pain going down towards my knees both from sides and back of my thighs. I have not had that in a while. Need to make sure that I am doing all 4 revised exercises correctly.   Precautions:     Vital Signs: NA     Pain: 0-3/10  Home Living: with family, with multiple steps      Prior Function Per Pt/Caregiver Report:     Objective   Posture: WNL     Range of Motion: Full lumbar ROM present in all planes. Lumbar flexion from standing produces mild left hip ache  Left hip external rotation moderately limited and painful. Right hip external rotation WNL  Bilateral hips flexion, extension, abduction, internal rotations WNL     Strength: Core 5/5                   Left hip flexion 5-/5                   Left hip abduction 5-/5                   Left hip extension 5/5  Right hip musculature 5/5     Flexibility: Left Gluteals, Piriformis, and Hamstrings tight     Palpation: tenderness and pain present with palpation of left Gluteals and Greater Trochanter     Special Tests: + left Chayo test      Outcome Measures:  Modifies Oswestry = 22 %     OP EDUCATION:       Intervention;   There ex time x 45 minutes  Reviewed Quadruped hip extensions. Added Quadruped fire hydrant exercises and Angry Cat exercise. Suggested placing stack of pillows under elbows to off set pressure from wrist.  Resumed seated slow lumbar flexions and Hamstrings stretches.  Emphasized slow reps, not over doing reps, and targeting time of day when usually more pain present. New Pictures/instructions provided, and both legs recommended.    Goals:  Active       PT Problem       PT Goal 1       Start:  02/06/24    Expected End:  04/05/24       Patient will reduce difficulties related to functional mobilities and quality of life, as evident by Modified Oswestry score will reduce by 50%         PT Goal 2       Start:  02/06/24    Expected End:  04/05/24       Patient will report reduced/abolished pain in left/right hip, indicated by grade of 0-2 on 0-10 pain scale, not exceeding intensity through different activities          PT Goal 3       Start:  02/06/24    Expected End:  04/05/24       Patient will demonstrated improved ROM of left hip external rotation, indicated by bilaterally equal external rotation, without symptoms           PT Goal 4       Start:  02/06/24    Expected End:  04/05/24       Patient will demonstrated improved strength of left hip musculature, evident by MMT of 5/5, and functionally evident by modified squats, step ups and ability to resume some of gym workouts as well as yoga          PT Goal 5       Start:  02/06/24    Expected End:  04/05/24       Patient will demonstrated knowledge of HEP, its maintenance frequency, and associated benefits

## 2024-04-02 ENCOUNTER — APPOINTMENT (OUTPATIENT)
Dept: PHYSICAL THERAPY | Facility: CLINIC | Age: 79
End: 2024-04-02
Payer: MEDICARE

## 2024-04-03 ENCOUNTER — DOCUMENTATION (OUTPATIENT)
Dept: PHYSICAL THERAPY | Facility: CLINIC | Age: 79
End: 2024-04-03
Payer: MEDICARE

## 2024-04-09 ENCOUNTER — APPOINTMENT (OUTPATIENT)
Dept: PHYSICAL THERAPY | Facility: CLINIC | Age: 79
End: 2024-04-09
Payer: MEDICARE

## 2024-04-16 ENCOUNTER — TREATMENT (OUTPATIENT)
Dept: PHYSICAL THERAPY | Facility: CLINIC | Age: 79
End: 2024-04-16
Payer: MEDICARE

## 2024-04-16 DIAGNOSIS — G89.29 CHRONIC BILATERAL LOW BACK PAIN, UNSPECIFIED WHETHER SCIATICA PRESENT: ICD-10-CM

## 2024-04-16 DIAGNOSIS — M54.50 CHRONIC BILATERAL LOW BACK PAIN, UNSPECIFIED WHETHER SCIATICA PRESENT: ICD-10-CM

## 2024-04-16 DIAGNOSIS — M48.062 SPINAL STENOSIS OF LUMBAR REGION WITH NEUROGENIC CLAUDICATION: ICD-10-CM

## 2024-04-16 PROCEDURE — 97110 THERAPEUTIC EXERCISES: CPT | Mod: GP

## 2024-04-16 NOTE — PROGRESS NOTES
Physical Therapy    Physical Therapy follow up    Patient Name: Viki Alejandre  MRN: [id number]  Today's Date: 4/16/2024                                                              Time Calculation  Start Time: 1015  Stop Time: 1100  Time Calculation (min): 45 min  Visit #6  Insurance; [employer]     Assessment; Patient presents with 6-7 months long history of pain in left buttock and partially right buttock, with potential for pain to travel along left leg into left Calf. Pain started while doing side step lunges with cattle bell in gym.  Palpation of left Gluteals and left Greater Trochanter tender and painful. Left hip external rotation + for left hip/buttock pain production. Lumbar movements in sagittal plane do not produce any symptoms. Palpation of lumbar spine free of pain. Provisional classification is left Gluteal strain. Posture fair. Tightness of Hamstrings and ITB present. Patient will benefit from learning and starting stretching and repeated movement exercises regiment. PT recommends 5 follows ups, once a week  2-; No exacerbation of pain with revised therex. Exercises understood and demonstrated correctly. Pictures for HEP also understood. All initially provided exercises stopped.   2-; Revised exercises did not produce and exacerbated pain in hips. New pictures understood and patient demonstrated correct form. Initial pain minimized by end of session.   3-5-2024; No pain at rest, no pain while walking level floors or squatting. Gait normal. Ascending/descending stairs with mild and short lasting left hip discomfort. New HEP revision understood and performed without pain exacerbation  3-; Revision of exercises and reduction of frequency and reps seem to be helping and controlling symptoms relatively low. Patient provided and instruction for HEP and showed good understanding, without exacerbation of pain.   4-; Exercises revised and proved to have potential to reduce ischial  pain significantly. Reassessment planned for 5-7-2024     Plan     Patient education/postural corrections and body mechanics  Therapeutic exercises  Recommended frequency; 1w5. Manual therapy to be used as deemed appropriate  by PT  Reassess readiness to return to recreational yoga    Current Problem  1. Spinal stenosis of lumbar region with neurogenic claudication  Follow Up In Physical Therapy      2. Chronic bilateral low back pain, unspecified whether sciatica present  Follow Up In Physical Therapy            General: Still hurting from fall while walking my dog on 3-. My left hip is achy, and sore ever since. That prevented me from doing much of anything, delay PT visits and PT exercises. My hip turned black and green and bothers me still. I was mad with myself for letting that happened, but chose not to go to ER or report to my PCP.  Precautions: fall precautions     Vital Signs: NA     Pain: both Hamstrings insertions (ischial folds - 6/10)  Home Living: with family, with multiple steps      Prior Function Per Pt/Caregiver Report: na      Objective   Posture: WNL     Range of Motion: Full lumbar ROM present in all planes. Lumbar flexion from standing produces mild left hip ache  Left hip external rotation moderately limited and painful. Right hip external rotation WNL  Bilateral hips flexion, extension, abduction, internal rotations WNL     Strength: Core 5/5                   Left hip flexion 5-/5                   Left hip abduction 5-/5                   Left hip extension 5/5  Right hip musculature 5/5     Flexibility: Left Gluteals, Piriformis, and Hamstrings tight     Palpation: tenderness and pain present with palpation of left Gluteals and Greater Trochanter     Special Tests: + left Chayo test      Outcome Measures:  Modifies Oswestry = 22 %     OP EDUCATION:       Intervention;   There ex time x 45 minutes  Revised exercises and decided to go forward with following there ex routine  Seated  Hamstrings stretches  Seated repeated lumbar flexions   Corrected posture slow sit/stand without arm assist  Repeated lumbar flexion/knees to chest  Clams without resistance (as long as no hip pain is produced)  Bridge without resistance (as long as not pain is produced)  Recommended gradual start of treadmill used at home  Goals:  Active       PT Problem       PT Goal 1 (Progressing)       Start:  02/06/24    Expected End:  05/31/24       Patient will reduce difficulties related to functional mobilities and quality of life, as evident by Modified Oswestry score will reduce by 50%         PT Goal 2 (Not Progressing)       Start:  02/06/24    Expected End:  05/31/24       Patient will report reduced/abolished pain in left/right hip, indicated by grade of 0-2 on 0-10 pain scale, not exceeding intensity through different activities          PT Goal 3       Start:  02/06/24    Expected End:  05/31/24       Patient will demonstrated improved ROM of left hip external rotation, indicated by bilaterally equal external rotation, without symptoms           PT Goal 4       Start:  02/06/24    Expected End:  05/31/24       Patient will demonstrated improved strength of left hip musculature, evident by MMT of 5/5, and functionally evident by modified squats, step ups and ability to resume some of gym workouts as well as yoga          PT Goal 5 (Progressing)       Start:  02/06/24    Expected End:  05/31/24       Patient will demonstrated knowledge of HEP, its maintenance frequency, and associated benefits

## 2024-05-03 ENCOUNTER — TELEPHONE (OUTPATIENT)
Dept: PRIMARY CARE | Facility: CLINIC | Age: 79
End: 2024-05-03
Payer: MEDICARE

## 2024-05-03 DIAGNOSIS — Z12.31 ENCOUNTER FOR SCREENING MAMMOGRAM FOR MALIGNANT NEOPLASM OF BREAST: ICD-10-CM

## 2024-05-03 DIAGNOSIS — Z12.39 BREAST SCREENING: Primary | ICD-10-CM

## 2024-05-03 NOTE — TELEPHONE ENCOUNTER
Patient would like a hard copy for a specialized mammogram mailed to her. Rx bilateral tomography 3D. The facility in [facility] will not allow her to schedule an appt without a hard copy.

## 2024-05-03 NOTE — PROGRESS NOTES
Subjective   Reason for Visit: Viki Alejandre is an 78 y.o. female here for a Medicare Wellness visit.               HPI  The patient reports a history of constant pain-unable describe-located at the medial and inferior surface of the right buttock x1 month.  The patient reports an increase in the intensity the pain when bending over, walking up stairs, and even just by sitting on the buttock.  She reports that she will experience associated pain extending from the right thigh to the right popliteal fossa when rising after having sat.  She reports no associated right lower extremity weakness/paresthesias.  She reports no preceding trauma/overexertion.      The patient reports that the aforementioned pain was preceded by frequent episodes of right buttock pain which developed in June finally resolving in August 2023.    The patient reports a history of intermittent episodes of discomfort in the left shoulder this year.  She reports that the episodes have been precipitated by the patient reaching back.  She reports no radiation of discomfort.  She reports no preceding trauma/overexertion.  No other associated symptoms.    The patient reports continued chronic intermittent episodes of discomfort in the right shoulder region times years.  She reports that the episodes again can be precipitated by the patient reaching back quickly.  She reports no radiation of discomfort.  She reports no other associated symptoms.  Over the past year or so she reports no change in the frequency or intensity of the episodes of discomfort.    The patient reports a history of intermittent episodes of pain-unable to describe-in both wrists times a few years.  She reports that the episodes are precipitated by the patient's wrists being in certain positions.  She reports no radiation of discomfort..  She reports no associated swelling she reports no preceding trauma/overexertion.  She reports no other associated symptoms.    The patient  reports a history of constant pain-unable to describe-over the entire neck over the past few years.  She reports that the intensity of the pain can increase when the patient is sleeping.  She reports no radiation of pain to the upper extremities.  She does report a history of constant numbness in the third and fourth fingers of the right hand times a few years.  She reports no upper extremity weakness.  No other associated symptoms.  Over the past 3 years, the patient reports possibly a decrease in the intensity of pain.    No other new complaints.  Patient Care Team:  Brian Rae MD as PCP - General  Brian Rae MD as PCP - [employer] PCP     Review of Systems  All systems have been reviewed and are normal except as previously noted  Objective   Vitals:  There were no vitals taken for this visit.      Physical Exam  Head-palpation revealed no tenderness over the maxillary or frontal sinuses  Eyes-extraocular movements intact pupils equal and reactive to light; fundi not well visualized secondary to the presence of cataracts  Ears-palpation of pinnas and traguses revealed no tenderness. External auditory canals not erythematous or swollen. TMs not visualized secondary to impacted cerumen.   Nose-turbinates not erythematous or swollen; no septal deviation noted  Mouth-posterior pharynx is not erythematous or swollen. Tonsillar pillars appeared normal no exudates  Neck no lymphadenopathy. Thyroid gland not enlarged. , No bruits  Tnczini-[id number]-siqymtvem was performed September 5th 2023  Lungs-clear to auscultation bilaterally  Cardiac--heart sounds distant, rate normal rhythm regular no murmurs no JVD  Abdomen-soft nondistended normal active bowel sounds. Palpation revealed no tenderness or masses  Pulses 2+ bilaterally  Extremities-no peripheral edema  Musculoskeletal  Cervical spine-no erythema or swelling.  Full range of motion with increased intensity of pain left side of the neck and left  upper back regions on rotation and bending to the left.  Full range of motion with no increased intensity of pain in all other directions of motion.  Palpation entire region revealed decreased intensity of pain, no increase in warmth  Left shoulder-no erythema or swelling.  Full range of motion in all directions of motion with no pain.  Palpation did reveal tenderness at the acromioclavicular joint but no increase in warmth.  Negative Milian sign, negative arm crossover test, negative impingement sign, negative Neer's sign, negative empty can test, negative Yergason sign  Right shoulder-no erythema or swelling. Decreased range of motion with  pain on internal rotation 70 degrees.  Full range of motion with pain noted on external rotation.. Full range of motion with no pain noted in all other directions of motion. Palpation did reveal tenderness over the medial surface proximal end of the right upper arm negative Milian sign, negative arm crossover test, positive impingement sign,, positive Neer's sign, negative empty can test, negative Yergason's sign  Wrists--no erythema or swelling, Full range of motion in all directions of motion with no pain. Palpation revealed no tenderness or increase in warmth  Right hip-no erythema or swelling.  Windshield wiper test negative.  Full range of motion in all directions of motion with no pain.  Palpation revealed no tenderness or increase in warmth  Lumbar spine-no erythema or swelling.  Full range of motion with increased intensity of pain over the medial end of the inferior surface of the right buttock on bending to the right.  Full range of motion with no increased intensity of pain in all other directions of motion.  Palpation revealed no increased tenderness or increase in warm    Neurologic  Mental status-alert and oriented x3   Cranial nerves-2 through 12 grossly intact, no visual field abnormalities  Motor-no pronator drift noted, strength-5/5 in all muscle groups  tested, , no tremor noted.  No bradykinesia noted.  No rigidity noted.  Negative pull test  Sensory-Light touch sensation fully intact  Pinprick sensation fully intact  Vibratory sensation fully intact  Cerebellar-no truncal ataxia, good coordination finger-nose testing,, good coordination heel-to-shin testing, normal rapid alternating movements  Romberg negative, no abnormality in tandem gait  Reflexes-1+/4 bilaterally  Assessment/Plan   Problem List Items Addressed This Visit    None       Assessment  Noncardiac chest pain  Hypertension-blood pressure at goal  Punctate left upper lobe nodule  COVID-19 November 2021  Cholecystitis status post cholecystectomy August 2018  Diverticulosis  Intermittent episodes of discomfort in the left shoulder region-May be secondary to osteoarthritis  Chronic intermittent episodes of discomfort in the right shoulder region-may be secondary to tears in right anterior supraspinatus and superior fibers of the subscapularis   Partial-thickness tearing and tendinopathy of long head of right biceps; possible full-thickness tear  Small full-thickness tears of the right anterior supraspinatus and superior fibers of the subscapularis  Osteoarthritis-diffuse including of the acromioclavicular joint of the right shoulder.  Intermittent episodes of pain-unable to describe-in both wrists-May be secondary to osteoarthritis  Hypercholesterolemia  Epidermoid cyst left preauricular region February 2020  Nonmelanoma skin cancer status post Mohs surgery-on the left side of the nose 2019  Multiple actinic keratoses May 2023  Acute bacterial conjunctivitis in both eyes February 27, 2023  Bilateral blepharitis  Chalazion left lower eyelid  Bilateral cataracts  Bilateral dry eye syndrome  Chronic constant pain-unable describe-located over the entire neck-may be secondary to osteoarthritis and degenerative disc disease of the cervical spine, inflammatory arthropathy, left-sided neuroforaminal stenosis  C4-C5, right-sided foraminal stenosis C5-C6  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  Central canal stenosis L4-L5  Plan  Obtain EKG and urinalysis today.  Obtain fasting lipid profile, CMP today.  Obtain x-rays of the lumbosacral spine today.  I did encourage the patient to use Aleve 440 mg p.o. twice daily as needed pain and I told the patient to apply Voltaren gel to painful regions twice daily as needed.  The patient may benefit from a rheumatology consultation-possibly a corticosteroid injection into the ischial bursa?     No

## 2024-05-07 ENCOUNTER — APPOINTMENT (OUTPATIENT)
Dept: PHYSICAL THERAPY | Facility: CLINIC | Age: 79
End: 2024-05-07
Payer: MEDICARE

## 2024-05-21 ENCOUNTER — TREATMENT (OUTPATIENT)
Dept: PHYSICAL THERAPY | Facility: CLINIC | Age: 79
End: 2024-05-21
Payer: MEDICARE

## 2024-05-21 DIAGNOSIS — M48.062 SPINAL STENOSIS OF LUMBAR REGION WITH NEUROGENIC CLAUDICATION: ICD-10-CM

## 2024-05-21 DIAGNOSIS — G89.29 CHRONIC BILATERAL LOW BACK PAIN, UNSPECIFIED WHETHER SCIATICA PRESENT: Primary | ICD-10-CM

## 2024-05-21 DIAGNOSIS — M54.50 CHRONIC BILATERAL LOW BACK PAIN, UNSPECIFIED WHETHER SCIATICA PRESENT: Primary | ICD-10-CM

## 2024-05-21 PROCEDURE — 97110 THERAPEUTIC EXERCISES: CPT | Mod: GP

## 2024-05-21 NOTE — PROGRESS NOTES
Physical Therapy    Physical Therapy follow up    Patient Name: Viki Alejandre  MRN: [id number]  Today's Date: 5/21/2024     PT Therapeutic Procedures Time Entry  Therapeutic Exercise Time Entry: 45                                                                              Visit #7  Insurance; [employer]     Assessment; Patient presents with 6-7 months long history of pain in left buttock and partially right buttock, with potential for pain to travel along left leg into left Calf. Pain started while doing side step lunges with cattle bell in gym.  Palpation of left Gluteals and left Greater Trochanter tender and painful. Left hip external rotation + for left hip/buttock pain production. Lumbar movements in sagittal plane do not produce any symptoms. Palpation of lumbar spine free of pain. Provisional classification is left Gluteal strain. Posture fair. Tightness of Hamstrings and ITB present. Patient will benefit from learning and starting stretching and repeated movement exercises regiment. PT recommends 5 follows ups, once a week  2-; No exacerbation of pain with revised therex. Exercises understood and demonstrated correctly. Pictures for HEP also understood. All initially provided exercises stopped.   2-; Revised exercises did not produce and exacerbated pain in hips. New pictures understood and patient demonstrated correct form. Initial pain minimized by end of session.   3-5-2024; No pain at rest, no pain while walking level floors or squatting. Gait normal. Ascending/descending stairs with mild and short lasting left hip discomfort. New HEP revision understood and performed without pain exacerbation  3-; Revision of exercises and reduction of frequency and reps seem to be helping and controlling symptoms relatively low. Patient provided and instruction for HEP and showed good understanding, without exacerbation of pain.   4-; Exercises revised and proved to have potential to reduce  ischial pain significantly. Reassessment planned for 5-7-2024 5-; Repeated lumbar flexion instructed and assisted in allowing reduction of pain in lower back and thighs to a minimal. Normal gait demonstrated. Patient verbalized instructions on therex and with main goal optimal pain control and avoiding excessive exercises.      Plan  Certification Period Start Date: 05/06/24  Certification Period End Date: 07/05/24  Rehab Potential: Good  Plan of Care Agreement: Patient  Patient education/postural corrections and body mechanics  Therapeutic exercises  Recommended frequency; reassessment only without visits for follow ups at this time, unless pain increases and patient struggles to manage pain with instructed there ex   Current Problem  1. Chronic bilateral low back pain, unspecified whether sciatica present        2. Spinal stenosis of lumbar region with neurogenic claudication              General: 5-; I am now finally able to go up and down stairs without problems. However, my sciatica is killing me.   Precautions: fall precautions     Vital Signs: NA     Pain: bilateral sciatic pain is back, with left a lot worse than right recently.   Left sciatic pain 6-7/10, right side 3-4/10 ( pain starts at bottom of my spine and along my thigh on left side, and every once in awhile it goes pass my knee - especially in the morning)  Home Living: with family, with multiple steps      Prior Function Per Pt/Caregiver Report: na      Objective   Posture: WNL     Range of Motion: Full lumbar ROM present in all planes. Lumbar flexion from standing produces mild left hip ache  Left hip external rotation moderately limited and painful. Right hip external rotation WNL  Bilateral hips flexion, extension, abduction, internal rotations WNL     Strength: Core 5/5                   Left hip flexion 5-/5                   Left hip abduction 5-/5                   Left hip extension 5/5  Right hip musculature 5/5      Flexibility: Left Gluteals, Piriformis, and Hamstrings tight     Palpation: tenderness and pain present with palpation of left Gluteals and Greater Trochanter     Special Tests: + left Chayo test      Outcome Measures:  Modifies Oswestry = 22 %     OP EDUCATION:       Intervention;   There ex time x 45 minutes  Patient educated in postural correction and control of activity that have potential to exacerbating her sciatic or hip pain.  Instructed repeated lumbar flexion from supine, RFIL  Instructed repeated lumbar flexion from sitting, RFIS  Instructed slow pace and relaxation in hook lying position prior to repeated movement.   Other exercises cancelled as pain seem to be under better control when not doing too much.  Intense exercises classes, other that aquatic classes, not recommended at this time  Goals:  Active       PT Problem       PT Goal 1 (Progressing)       Start:  02/06/24    Expected End:  05/31/24       Patient will reduce difficulties related to functional mobilities and quality of life, as evident by Modified Oswestry score will reduce by 50%         PT Goal 2 (Not Progressing)       Start:  02/06/24    Expected End:  05/31/24       Patient will report reduced/abolished pain in left/right hip, indicated by grade of 0-2 on 0-10 pain scale, not exceeding intensity through different activities          PT Goal 3       Start:  02/06/24    Expected End:  05/31/24       Patient will demonstrated improved ROM of left hip external rotation, indicated by bilaterally equal external rotation, without symptoms           PT Goal 4       Start:  02/06/24    Expected End:  05/31/24       Patient will demonstrated improved strength of left hip musculature, evident by MMT of 5/5, and functionally evident by modified squats, step ups and ability to resume some of gym workouts as well as yoga          PT Goal 5 (Progressing)       Start:  02/06/24    Expected End:  05/31/24       Patient will demonstrated knowledge  of HEP, its maintenance frequency, and associated benefits

## 2024-05-28 ENCOUNTER — APPOINTMENT (OUTPATIENT)
Dept: PHYSICAL THERAPY | Facility: CLINIC | Age: 79
End: 2024-05-28
Payer: MEDICARE

## 2024-06-11 ENCOUNTER — DOCUMENTATION (OUTPATIENT)
Dept: PHYSICAL THERAPY | Facility: CLINIC | Age: 79
End: 2024-06-11
Payer: MEDICARE

## 2024-06-21 ENCOUNTER — DOCUMENTATION (OUTPATIENT)
Dept: PHYSICAL THERAPY | Facility: CLINIC | Age: 79
End: 2024-06-21
Payer: MEDICARE

## 2024-06-21 NOTE — PROGRESS NOTES
Physical Therapy    Discharge Summary    Name: Viki Alejandre  MRN: [id number]  : 1945  Date: 24    Discharge Summary: PT    Discharge Information: Date of discharge 2024, Date of last visit 2024, Date of evaluation 2024, Number of attended visits 7, Referred by Brian Centeno, and Referred for back and hips pain    Therapy Summary: PT and patient develop stable HEP which is able to maintain symptoms at minimal, allow easier mobility and stair climbing. Patient discouraged from intense workouts which tend to lead to flare up of pain along hips     Discharge Status: Improved and stable regarding pain control at this time     Rehab Discharge Reason: Achieved all and/or the most significant goals(s)

## 2024-09-09 DIAGNOSIS — Z12.11 COLON CANCER SCREENING: Primary | ICD-10-CM

## 2024-09-09 DIAGNOSIS — Z12.11 COLON CANCER SCREENING: ICD-10-CM

## 2024-09-09 RX ORDER — POLYETHYLENE GLYCOL 3350, SODIUM SULFATE ANHYDROUS, SODIUM BICARBONATE, SODIUM CHLORIDE, POTASSIUM CHLORIDE 236; 22.74; 6.74; 5.86; 2.97 G/4L; G/4L; G/4L; G/4L; G/4L
4 POWDER, FOR SOLUTION ORAL ONCE
Qty: 4000 ML | Refills: 0 | Status: SHIPPED | OUTPATIENT
Start: 2024-09-09 | End: 2024-09-09

## 2024-10-01 ENCOUNTER — TELEPHONE (OUTPATIENT)
Dept: GASTROENTEROLOGY | Facility: CLINIC | Age: 79
End: 2024-10-01
Payer: MEDICARE

## 2024-10-01 DIAGNOSIS — Z12.11 COLON CANCER SCREENING: Primary | ICD-10-CM

## 2024-10-01 RX ORDER — POLYETHYLENE GLYCOL 3350, SODIUM SULFATE ANHYDROUS, SODIUM BICARBONATE, SODIUM CHLORIDE, POTASSIUM CHLORIDE 236; 22.74; 6.74; 5.86; 2.97 G/4L; G/4L; G/4L; G/4L; G/4L
4 POWDER, FOR SOLUTION ORAL ONCE
Qty: 4000 ML | Refills: 0 | Status: SHIPPED | OUTPATIENT
Start: 2024-10-01 | End: 2024-10-01

## 2024-10-03 ENCOUNTER — LAB REQUISITION (OUTPATIENT)
Dept: LAB | Facility: HOSPITAL | Age: 79
End: 2024-10-03
Payer: MEDICARE

## 2024-10-03 ENCOUNTER — APPOINTMENT (OUTPATIENT)
Dept: GASTROENTEROLOGY | Facility: EXTERNAL LOCATION | Age: 79
End: 2024-10-03
Payer: MEDICARE

## 2024-10-03 DIAGNOSIS — Z12.11 SPECIAL SCREENING FOR MALIGNANT NEOPLASMS, COLON: ICD-10-CM

## 2024-10-03 DIAGNOSIS — Z86.0100 HISTORY OF COLONIC POLYPS: ICD-10-CM

## 2024-10-03 DIAGNOSIS — D12.3 BENIGN NEOPLASM OF TRANSVERSE COLON: Primary | ICD-10-CM

## 2024-10-03 PROCEDURE — 88305 TISSUE EXAM BY PATHOLOGIST: CPT | Performed by: STUDENT IN AN ORGANIZED HEALTH CARE EDUCATION/TRAINING PROGRAM

## 2024-10-03 PROCEDURE — 88305 TISSUE EXAM BY PATHOLOGIST: CPT

## 2024-10-09 NOTE — PROGRESS NOTES
Subjective   Patient ID: Viki Alejandre is a 79 y.o. female who presents for right shoulder pain    HPI   Over the past year, the patient reports continued chronic intermittent episodes of discomfort in the right shoulder region.  She reports that the episodes can again be precipitated by particular activities.  Recently, she reports experiencing radiation of discomfort to the distal end of the right upper arm.  She reports no other associated symptoms.  Over the past year, she reports an increase in the frequency and intensity of the episodes of discomfort.  Review of Systems    Objective   There were no vitals taken for this visit.    Physical Exam  Musculoskeletal  Right shoulder-no erythema or swelling. Decreased range of motion with pain noted on flexion 80 degrees.  Decreased range of motion with no pain noted on internal rotation 50 degrees. Full range of motion with pain noted on external rotation.  Full range of motion with no pain noted on abduction 90 degrees.  Palpation revealed no tenderness or increase in warmth.  Positive Milian sign, positive arm crossover test, negative impingement sign, positive Neer's sign, negative empty can test, negative Yergason's sign  Assessment/Plan   Problem List Items Addressed This Visit             ICD-10-CM    Benign essential hypertension I10    Inflammatory arthritis M19.90     Other Visit Diagnoses         Codes    Chronic right shoulder pain    -  Primary M25.511, G89.29    Relevant Medications    meloxicam (Mobic) 15 mg tablet    Other Relevant Orders    MR shoulder right wo IV contrast              Assessment     Chronic intermittent episodes of discomfort in the right shoulder region-may be secondary to tears in right anterior supraspinatus and superior fibers of the subscapularis, partial-thickness tearing and tendinopathy of the long head of the right biceps, osteoarthritis of the acromioclavicular joint of the right shoulder   Partial-thickness tearing and  tendinopathy of long head of right biceps; possible full-thickness tear  Small full-thickness tears of the right anterior supraspinatus and superior fibers of the subscapularis  Osteoarthritis-diffuse including of the acromioclavicular joint of the right shoulder.  Plan  Obtain repeat MRI of the right shoulder-the last study was done in November 2016.  Begin meloxicam 15 mg p.o. daily as needed.  The patient will probably require an orthopedic referral

## 2024-10-10 ENCOUNTER — OFFICE VISIT (OUTPATIENT)
Dept: PRIMARY CARE | Facility: CLINIC | Age: 79
End: 2024-10-10
Payer: MEDICARE

## 2024-10-10 VITALS
WEIGHT: 132.2 LBS | DIASTOLIC BLOOD PRESSURE: 90 MMHG | HEART RATE: 80 BPM | SYSTOLIC BLOOD PRESSURE: 120 MMHG | BODY MASS INDEX: 24.98 KG/M2

## 2024-10-10 DIAGNOSIS — G89.29 CHRONIC RIGHT SHOULDER PAIN: Primary | ICD-10-CM

## 2024-10-10 DIAGNOSIS — M25.511 CHRONIC RIGHT SHOULDER PAIN: Primary | ICD-10-CM

## 2024-10-10 DIAGNOSIS — I10 BENIGN ESSENTIAL HYPERTENSION: ICD-10-CM

## 2024-10-10 DIAGNOSIS — M19.90 INFLAMMATORY ARTHRITIS: ICD-10-CM

## 2024-10-10 PROCEDURE — 3080F DIAST BP >= 90 MM HG: CPT | Performed by: INTERNAL MEDICINE

## 2024-10-10 PROCEDURE — 1159F MED LIST DOCD IN RCRD: CPT | Performed by: INTERNAL MEDICINE

## 2024-10-10 PROCEDURE — 99213 OFFICE O/P EST LOW 20 MIN: CPT | Performed by: INTERNAL MEDICINE

## 2024-10-10 PROCEDURE — 3074F SYST BP LT 130 MM HG: CPT | Performed by: INTERNAL MEDICINE

## 2024-10-10 PROCEDURE — 1160F RVW MEDS BY RX/DR IN RCRD: CPT | Performed by: INTERNAL MEDICINE

## 2024-10-10 RX ORDER — MELOXICAM 15 MG/1
15 TABLET ORAL DAILY
Qty: 30 TABLET | Refills: 2 | Status: SHIPPED | OUTPATIENT
Start: 2024-10-10 | End: 2025-10-10

## 2024-10-11 LAB
LABORATORY COMMENT REPORT: NORMAL
PATH REPORT.FINAL DX SPEC: NORMAL
PATH REPORT.GROSS SPEC: NORMAL
PATH REPORT.RELEVANT HX SPEC: NORMAL
PATH REPORT.TOTAL CANCER: NORMAL

## 2024-10-28 ENCOUNTER — APPOINTMENT (OUTPATIENT)
Dept: RADIOLOGY | Facility: CLINIC | Age: 79
End: 2024-10-28
Payer: MEDICARE

## 2025-01-13 ENCOUNTER — APPOINTMENT (OUTPATIENT)
Dept: GASTROENTEROLOGY | Facility: EXTERNAL LOCATION | Age: 80
End: 2025-01-13
Payer: MEDICARE

## 2025-04-02 ENCOUNTER — APPOINTMENT (OUTPATIENT)
Dept: GASTROENTEROLOGY | Facility: CLINIC | Age: 80
End: 2025-04-02
Payer: MEDICARE

## 2025-04-02 VITALS — WEIGHT: 131 LBS | BODY MASS INDEX: 24.73 KG/M2 | HEIGHT: 61 IN

## 2025-04-02 DIAGNOSIS — K92.1 BLACK STOOL: Primary | ICD-10-CM

## 2025-04-02 DIAGNOSIS — R19.7 DIARRHEA, UNSPECIFIED TYPE: ICD-10-CM

## 2025-04-02 DIAGNOSIS — R14.0 ABDOMINAL DISTENTION: ICD-10-CM

## 2025-04-02 PROCEDURE — 99214 OFFICE O/P EST MOD 30 MIN: CPT | Performed by: INTERNAL MEDICINE

## 2025-04-02 PROCEDURE — 1159F MED LIST DOCD IN RCRD: CPT | Performed by: INTERNAL MEDICINE

## 2025-04-02 ASSESSMENT — ENCOUNTER SYMPTOMS
OCCASIONAL FEELINGS OF UNSTEADINESS: 0
LOSS OF SENSATION IN FEET: 0
DEPRESSION: 0

## 2025-04-02 NOTE — PROGRESS NOTES
Subjective     History of Present Illness:     80yo with HTN, osteopenia, spinal stenosis seen for change in bowel movements. She was seen in 10/2024 for surveillance colonoscopy and reported blood per rectum. Colonoscopy showed 2 polyps- TA, HP, diverticulosis, hemorrhoids, normal terminal ileum.   No recent labs available.  2 weeks ago had urgent diarrhea that was very black. Some nausea, no vomiting. Black stools lasted for 24 hours, then greenish black.  Smaller and in pieces.  Black /green stools have persisted since then.   No abdominal pain , no heartburn indigestion.  No dizziness or lightheadedness. No weight loss .   + abdominal bloating and distention  No otc nsaids.  After this started was drinking pepto bismol.  After colonoscopy on metamucil without change. Currently stool is soft, not water.    Diet high in red meat. Low in fruits /veg fiber.  Takes magnesium, aspirin 81mg daily.      Colonoscopy 1/2020- diverticulosis  Colonoscopy 2015- diverticulosis      Allergies  Allergies   Allergen Reactions    Propoxyphene Other and Nausea/vomiting       Medications  Current Outpatient Medications   Medication Instructions    alpha tocopherol (VITAMIN E) 400 Units, oral, 2 times daily    ascorbic acid (VITAMIN C) 1,000 mg, oral, 2 times daily    aspirin 81 mg, oral, Daily RT    B complex-vitamin C-folic acid (Nephro-Pedro Rx) 1- mg-mg-mcg tablet 1 tablet, oral, Daily with breakfast    calcium carbonate (Tums) 200 mg calcium chewable tablet 2 tablets, oral, 2 times daily    cholecalciferol (VITAMIN D-3) 5,000 Units, oral, Daily RT    DULoxetine (CYMBALTA) 20 mg, oral, Daily, Do not crush or chew.    flaxseed oiL 1,000 mg capsule 2 capsules, oral, Daily RT    folic acid (FOLVITE) 0.4 mg, oral, Daily    meloxicam (MOBIC) 15 mg, oral, Daily    triamterene-hydrochlorothiazid (Maxzide) 75-50 mg tablet 1 tablet, oral, Daily        Objective   There were no vitals taken for this visit.   Physical  Exam  Cardiovascular:      Rate and Rhythm: Normal rate and regular rhythm.   Pulmonary:      Effort: Pulmonary effort is normal. No respiratory distress.      Breath sounds: Normal breath sounds. No wheezing.   Abdominal:      General: Bowel sounds are normal. There is no distension.      Palpations: Abdomen is soft. There is no mass.      Tenderness: There is no abdominal tenderness.   Neurological:      Mental Status: She is alert.               Assessment/Plan:      80yo with HTN, osteopenia, spinal stenosis seen for change in bowel movements.  Intermittent change in bowel movements and dark/black stools.  No abdominal pain, weight loss, however exam notable for abdominal distention. Recommend abdominal CT, labs and EGD to evaluate for erosive disease . Advised pt to hold aspirin and magnesium.    Isi Batista MD

## 2025-04-04 LAB
ALBUMIN SERPL-MCNC: 4.3 G/DL (ref 3.6–5.1)
ALP SERPL-CCNC: 65 U/L (ref 37–153)
ALT SERPL-CCNC: 16 U/L (ref 6–29)
ANION GAP SERPL CALCULATED.4IONS-SCNC: 8 MMOL/L (CALC) (ref 7–17)
AST SERPL-CCNC: 20 U/L (ref 10–35)
BILIRUB SERPL-MCNC: 0.4 MG/DL (ref 0.2–1.2)
BUN SERPL-MCNC: 21 MG/DL (ref 7–25)
CALCIUM SERPL-MCNC: 9.6 MG/DL (ref 8.6–10.4)
CHLORIDE SERPL-SCNC: 102 MMOL/L (ref 98–110)
CO2 SERPL-SCNC: 30 MMOL/L (ref 20–32)
CREAT SERPL-MCNC: 0.71 MG/DL (ref 0.6–1)
CRP SERPL-MCNC: 7.8 MG/L
EGFRCR SERPLBLD CKD-EPI 2021: 88 ML/MIN/1.73M2
ERYTHROCYTE [DISTWIDTH] IN BLOOD BY AUTOMATED COUNT: 12.2 % (ref 11–15)
GLIADIN IGA SER IA-ACNC: 35.4 U/ML
GLIADIN IGG SER IA-ACNC: <1 U/ML
GLUCOSE SERPL-MCNC: 100 MG/DL (ref 65–99)
HCT VFR BLD AUTO: 43.6 % (ref 35–45)
HGB BLD-MCNC: 14.3 G/DL (ref 11.7–15.5)
IGA SERPL-MCNC: 202 MG/DL (ref 70–320)
MCH RBC QN AUTO: 31 PG (ref 27–33)
MCHC RBC AUTO-ENTMCNC: 32.8 G/DL (ref 32–36)
MCV RBC AUTO: 94.6 FL (ref 80–100)
PLATELET # BLD AUTO: 394 THOUSAND/UL (ref 140–400)
PMV BLD REES-ECKER: 9.7 FL (ref 7.5–12.5)
POTASSIUM SERPL-SCNC: 4.8 MMOL/L (ref 3.5–5.3)
PROT SERPL-MCNC: 6.2 G/DL (ref 6.1–8.1)
RBC # BLD AUTO: 4.61 MILLION/UL (ref 3.8–5.1)
SODIUM SERPL-SCNC: 140 MMOL/L (ref 135–146)
TSH SERPL-ACNC: 1.73 MIU/L (ref 0.4–4.5)
TTG IGA SER-ACNC: <1 U/ML
TTG IGG SER-ACNC: <1 U/ML
WBC # BLD AUTO: 7.4 THOUSAND/UL (ref 3.8–10.8)

## 2025-04-10 ENCOUNTER — HOSPITAL ENCOUNTER (OUTPATIENT)
Dept: RADIOLOGY | Facility: CLINIC | Age: 80
Discharge: HOME | End: 2025-04-10
Payer: MEDICARE

## 2025-04-10 DIAGNOSIS — R14.0 ABDOMINAL DISTENTION: ICD-10-CM

## 2025-04-10 DIAGNOSIS — R19.7 DIARRHEA, UNSPECIFIED TYPE: ICD-10-CM

## 2025-04-10 DIAGNOSIS — K92.1 BLACK STOOL: ICD-10-CM

## 2025-04-10 PROCEDURE — 2550000001 HC RX 255 CONTRASTS: Performed by: INTERNAL MEDICINE

## 2025-04-10 PROCEDURE — 74177 CT ABD & PELVIS W/CONTRAST: CPT

## 2025-04-10 RX ADMIN — IOHEXOL 75 ML: 350 INJECTION, SOLUTION INTRAVENOUS at 10:08

## 2025-04-14 ENCOUNTER — APPOINTMENT (OUTPATIENT)
Dept: GASTROENTEROLOGY | Facility: EXTERNAL LOCATION | Age: 80
End: 2025-04-14
Payer: MEDICARE

## 2025-04-14 DIAGNOSIS — K92.1 BLACK STOOL: ICD-10-CM

## 2025-04-14 DIAGNOSIS — R14.0 ABDOMINAL DISTENTION: ICD-10-CM

## 2025-04-14 DIAGNOSIS — R76.8 POSITIVE AUTOANTIBODY SCREENING FOR CELIAC DISEASE: ICD-10-CM

## 2025-04-14 DIAGNOSIS — R19.7 DIARRHEA, UNSPECIFIED TYPE: ICD-10-CM

## 2025-04-14 DIAGNOSIS — K44.9 HIATAL HERNIA: ICD-10-CM

## 2025-04-14 DIAGNOSIS — K29.70 GASTRITIS WITHOUT BLEEDING, UNSPECIFIED CHRONICITY, UNSPECIFIED GASTRITIS TYPE: ICD-10-CM

## 2025-04-14 DIAGNOSIS — K21.9 GASTROESOPHAGEAL REFLUX DISEASE WITHOUT ESOPHAGITIS: Primary | ICD-10-CM

## 2025-04-14 DIAGNOSIS — K92.1 BLACK STOOL: Primary | ICD-10-CM

## 2025-04-14 PROCEDURE — 43239 EGD BIOPSY SINGLE/MULTIPLE: CPT | Performed by: INTERNAL MEDICINE

## 2025-04-14 PROCEDURE — 88305 TISSUE EXAM BY PATHOLOGIST: CPT | Performed by: PATHOLOGY

## 2025-04-14 PROCEDURE — 0753T DGTZ GLS MCRSCP SLD LEVEL IV: CPT

## 2025-04-14 PROCEDURE — 88305 TISSUE EXAM BY PATHOLOGIST: CPT

## 2025-04-14 RX ORDER — OMEPRAZOLE 40 MG/1
40 CAPSULE, DELAYED RELEASE ORAL
Qty: 90 CAPSULE | Refills: 0 | Status: SHIPPED | OUTPATIENT
Start: 2025-04-14 | End: 2025-10-11

## 2025-04-15 ENCOUNTER — LAB REQUISITION (OUTPATIENT)
Dept: LAB | Facility: HOSPITAL | Age: 80
End: 2025-04-15
Payer: MEDICARE

## 2025-04-15 DIAGNOSIS — K92.1 MELENA: ICD-10-CM

## 2025-05-11 DIAGNOSIS — I10 BENIGN ESSENTIAL HYPERTENSION: ICD-10-CM

## 2025-05-12 RX ORDER — TRIAMTERENE AND HYDROCHLOROTHIAZIDE 75; 50 MG/1; MG/1
1 TABLET ORAL DAILY
Qty: 90 TABLET | Refills: 3 | Status: SHIPPED | OUTPATIENT
Start: 2025-05-12

## 2025-06-06 ENCOUNTER — TELEPHONE (OUTPATIENT)
Dept: PRIMARY CARE | Facility: CLINIC | Age: 80
End: 2025-06-06
Payer: MEDICARE

## 2025-06-06 DIAGNOSIS — Z12.31 ENCOUNTER FOR SCREENING MAMMOGRAM FOR MALIGNANT NEOPLASM OF BREAST: ICD-10-CM

## 2025-06-06 DIAGNOSIS — Z12.39 BREAST SCREENING: Primary | ICD-10-CM

## 2025-06-18 ENCOUNTER — APPOINTMENT (OUTPATIENT)
Dept: GASTROENTEROLOGY | Facility: CLINIC | Age: 80
End: 2025-06-18
Payer: MEDICARE

## 2025-06-18 VITALS — HEIGHT: 61 IN | HEART RATE: 78 BPM | BODY MASS INDEX: 23.41 KG/M2 | WEIGHT: 124 LBS

## 2025-06-18 DIAGNOSIS — R19.7 DIARRHEA, UNSPECIFIED TYPE: Primary | ICD-10-CM

## 2025-06-18 PROCEDURE — 1036F TOBACCO NON-USER: CPT | Performed by: INTERNAL MEDICINE

## 2025-06-18 PROCEDURE — 99214 OFFICE O/P EST MOD 30 MIN: CPT | Performed by: INTERNAL MEDICINE

## 2025-06-18 NOTE — PROGRESS NOTES
Subjective     History of Present Illness:     80yo with HTN, osteopenia, spinal stenosis seen in follow up for change in bowel movements. Last seen 2 mo ago with dark stools/black, nausea, no vomiting. Resolved then dark stools persisting ,associated with bloating, distention, no pain. No otc nsaids, but was taking pepto bismol. Taking metamucil without change. Diet low in fiber.   EGD 4/14/25- gastric erythema, normal duodenum. Path mild chronic gastritis, duodenal bx nl.  CT a/p 4/10- no acute findings, diverticulosis  HBT ordered scheduled in July     Cont to have more frequent bowel movements 2-3 x per day loose, reported as diarrhea. Only associated sx are bloating. NO associated sx of abd pain, n/v, rectal bleeding. Highly concerned about color of stool which she states is dark green, tan.  CT a/p negative, mild biliary dilation 2/2 post cholecystectomy, liver tests wnl,  Lost 8 pounds due to food avoidance and then regained 4 pounds.  Continues on metamucil once daily which has helped.  Diet high in red meat. Low in fruits /veg fiber.  Takes magnesium, aspirin 81mg daily.      Colonoscopy 10/2024 -2 polyps- TA, HP, diverticulosis, hemorrhoids, normal terminal ileum.    Colonoscopy  2020- diverticulosis  Colonoscopy 2015- diverticulosis    Allergies  RX Allergies[1]    Medications  Current Outpatient Medications   Medication Instructions    alpha tocopherol (VITAMIN E) 400 Units, oral, 2 times daily    ascorbic acid (VITAMIN C) 1,000 mg, oral, 2 times daily    aspirin 81 mg, oral, Daily RT    B complex-vitamin C-folic acid (Nephro-Pedro Rx) 1- mg-mg-mcg tablet 1 tablet, oral, Daily with breakfast    calcium carbonate (Tums) 200 mg calcium chewable tablet 2 tablets, oral, 2 times daily    cholecalciferol (VITAMIN D-3) 5,000 Units, oral, Daily RT    DULoxetine (CYMBALTA) 20 mg, oral, Daily, Do not crush or chew.    flaxseed oiL 1,000 mg capsule 2 capsules, oral, Daily RT    folic acid (FOLVITE) 0.4 mg,  oral, Daily    meloxicam (MOBIC) 15 mg, oral, Daily    omeprazole (PRILOSEC) 40 mg, oral, Daily before breakfast, Do not crush or chew.    triamterene-hydrochlorothiazid (Maxzide) 75-50 mg tablet 1 tablet, oral, Daily        Objective   There were no vitals taken for this visit.   Physical Exam  Vitals reviewed.   Constitutional:       General: She is awake.   Cardiovascular:      Rate and Rhythm: Normal rate and regular rhythm.   Pulmonary:      Effort: Pulmonary effort is normal.      Breath sounds: Normal breath sounds.   Abdominal:      General: Bowel sounds are normal.      Palpations: Abdomen is soft.      Tenderness: There is no abdominal tenderness.   Neurological:      Mental Status: She is alert and oriented to person, place, and time.   Psychiatric:         Attention and Perception: Attention and perception normal.         Behavior: Behavior normal.               Assessment/Plan:    80yo with HTN, osteopenia, spinal stenosis seen for change in bowel movements.  Intermittent change in bowel movements and dark/stools, EGD unremarkable, colonoscopy 8 mo ago negative, CT negative, labs wnl.  Some improvement with fiber supplement. Recommend increasing to twice daily.  Has HBT scheduled in 4 weeks and will also check panc elastase , fecal calprotectin and O+P.    If needed, may perform flex sig to biopsy for microscopic colitis        Isi Batista MD              [1]   Allergies  Allergen Reactions    Propoxyphene Other and Nausea/vomiting

## 2025-06-20 DIAGNOSIS — R19.7 DIARRHEA, UNSPECIFIED TYPE: Primary | ICD-10-CM

## 2025-06-20 RX ORDER — COLESEVELAM 180 1/1
1875 TABLET ORAL DAILY
Qty: 90 TABLET | Refills: 0 | Status: SHIPPED | OUTPATIENT
Start: 2025-06-20 | End: 2026-06-20

## 2025-06-27 LAB
C DIFF TOX GENS STL QL NAA+PROBE: NOT DETECTED
CALPROTECTIN STL-MCNT: NORMAL UG/G
CRYPTOSP AG STL QL IA: NORMAL
ELASTASE PANC STL-MCNT: NORMAL UG/G
FAT STL SUDAN IV STN: ABNORMAL
G LAMBLIA AG STL QL IA: NORMAL
O+P STL CONC: NORMAL
O+P STL TRI STN: NORMAL

## 2025-07-03 LAB
CALPROTECTIN STL-MCNT: 66 MCG/G
CRYPTOSP AG STL QL IA: NORMAL
ELASTASE PANC STL-MCNT: 359 MCG/G
FAT STL SUDAN IV STN: ABNORMAL
G LAMBLIA AG STL QL IA: NORMAL
O+P STL CONC: NORMAL
O+P STL TRI STN: NORMAL

## 2025-07-08 LAB
CALPROTECTIN STL-MCNT: 66 MCG/G
CRYPTOSP AG STL QL IA: NORMAL
ELASTASE PANC STL-MCNT: 359 MCG/G
FAT STL SUDAN IV STN: ABNORMAL
G LAMBLIA AG STL QL IA: NORMAL
O+P STL TRI STN: NORMAL

## 2025-07-12 DIAGNOSIS — K21.9 GASTROESOPHAGEAL REFLUX DISEASE WITHOUT ESOPHAGITIS: ICD-10-CM

## 2025-07-14 RX ORDER — OMEPRAZOLE 40 MG/1
40 CAPSULE, DELAYED RELEASE ORAL
Qty: 90 CAPSULE | Refills: 2 | Status: SHIPPED | OUTPATIENT
Start: 2025-07-14 | End: 2026-01-10

## 2025-07-21 ENCOUNTER — PROCEDURE VISIT (OUTPATIENT)
Dept: GASTROENTEROLOGY | Facility: CLINIC | Age: 80
End: 2025-07-21
Payer: MEDICARE

## 2025-07-21 DIAGNOSIS — R19.7 DIARRHEA, UNSPECIFIED TYPE: ICD-10-CM

## 2025-07-21 PROCEDURE — 91065 BREATH HYDROGEN/METHANE TEST: CPT

## 2025-07-21 PROCEDURE — 91065 BREATH HYDROGEN/METHANE TEST: CPT | Performed by: INTERNAL MEDICINE

## 2025-07-21 NOTE — PROGRESS NOTES
"Patient ID: Viki Alejandre \"Teresa\" is a 79 y.o. female.    Breath Hydrogen Test-Bacterial Overgrowth    Date/Time: 7/21/2025 11:11 AM    Performed by: Fabiola Christopher MA  Authorized by: Isi Batista MD    Consent:     Consent obtained:  Verbal    Consent given by:  Patient  Universal protocol:     Procedure explained and questions answered to patient or proxy's satisfaction: yes      Relevant documents present and verified: yes      Test results available: yes    Sedation:     Sedation type:  None  Anesthesia:     Anesthesia method:  None  Post-procedure details:     Procedure completion:  Tolerated  Hydrogen Breath Analysis Consultation Sheet    Referring Provider: Isi Batista MD  [address] [facility], [place] [address]    Indication: R/O SIBO    Symptoms: DIARRHEA (R19.7)    Age: 79 y.o.  Weight: There were no vitals filed for this visit.  Substrate: glucose   Dose: 75 grams    Last Meal: 7/20/25 2100   Recent Antibiotics: Denies    RESULTS:   Time PPM (H2) APPM* (CH4) CO2 Correction   Baseline #1 0856 2 0 2.8 1.96   Baseline #2 0858 4 0 2.7 2.03   *Challenge Dose Sugar: 0900  15' 0915 31 9 2.5 2.20   30' 0930 32 8 2.6 2.11   45' 0945 19 6 2.9 1.89   60' 1000 44 11 2.4 2.29   75' 1015 55 12 2.7 2.03   90' 1030 57 14 3.3 2.39   105' 1045 57 14 2.3 2.39   120' 1100 36 10 3.2 1.71   135'        150'        165'        180'          Impression: Positive for SIBO    "

## 2025-08-11 DIAGNOSIS — K58.0 IRRITABLE BOWEL SYNDROME WITH DIARRHEA: Primary | ICD-10-CM

## 2025-08-12 ENCOUNTER — TELEPHONE (OUTPATIENT)
Dept: GASTROENTEROLOGY | Facility: CLINIC | Age: 80
End: 2025-08-12
Payer: MEDICARE

## 2025-08-14 DIAGNOSIS — K58.0 IRRITABLE BOWEL SYNDROME WITH DIARRHEA: ICD-10-CM

## 2025-09-03 ENCOUNTER — APPOINTMENT (OUTPATIENT)
Dept: GASTROENTEROLOGY | Facility: CLINIC | Age: 80
End: 2025-09-03
Payer: MEDICARE

## 2025-09-17 ENCOUNTER — APPOINTMENT (OUTPATIENT)
Dept: GASTROENTEROLOGY | Facility: CLINIC | Age: 80
End: 2025-09-17
Payer: MEDICARE

## 2026-02-02 ENCOUNTER — APPOINTMENT (OUTPATIENT)
Dept: PRIMARY CARE | Facility: CLINIC | Age: 81
End: 2026-02-02
Payer: MEDICARE